# Patient Record
Sex: FEMALE | Race: WHITE | NOT HISPANIC OR LATINO
[De-identification: names, ages, dates, MRNs, and addresses within clinical notes are randomized per-mention and may not be internally consistent; named-entity substitution may affect disease eponyms.]

---

## 2019-02-13 PROBLEM — Z00.00 ENCOUNTER FOR PREVENTIVE HEALTH EXAMINATION: Status: ACTIVE | Noted: 2019-02-13

## 2019-03-20 ENCOUNTER — APPOINTMENT (OUTPATIENT)
Dept: OBGYN | Facility: CLINIC | Age: 24
End: 2019-03-20

## 2019-06-05 ENCOUNTER — APPOINTMENT (OUTPATIENT)
Dept: OBGYN | Facility: CLINIC | Age: 24
End: 2019-06-05
Payer: COMMERCIAL

## 2019-06-05 VITALS
SYSTOLIC BLOOD PRESSURE: 116 MMHG | BODY MASS INDEX: 28.04 KG/M2 | WEIGHT: 185 LBS | DIASTOLIC BLOOD PRESSURE: 64 MMHG | HEIGHT: 68 IN

## 2019-06-05 PROCEDURE — 81025 URINE PREGNANCY TEST: CPT | Mod: NC

## 2019-06-05 PROCEDURE — 81003 URINALYSIS AUTO W/O SCOPE: CPT | Mod: NC,QW

## 2019-06-05 PROCEDURE — 99204 OFFICE O/P NEW MOD 45 MIN: CPT

## 2019-06-06 ENCOUNTER — NON-APPOINTMENT (OUTPATIENT)
Age: 24
End: 2019-06-06

## 2019-06-07 ENCOUNTER — NON-APPOINTMENT (OUTPATIENT)
Age: 24
End: 2019-06-07

## 2019-06-07 LAB
BILIRUB UR QL STRIP: NORMAL
GLUCOSE UR-MCNC: NORMAL
HCG UR QL: 0.2 EU/DL
HCG UR QL: POSITIVE
HGB UR QL STRIP.AUTO: NORMAL
KETONES UR-MCNC: NORMAL
LEUKOCYTE ESTERASE UR QL STRIP: NORMAL
NITRITE UR QL STRIP: NORMAL
PH UR STRIP: 8.5
PROT UR STRIP-MCNC: NORMAL
QUALITY CONTROL: YES
SP GR UR STRIP: 1.01

## 2019-06-10 ENCOUNTER — APPOINTMENT (OUTPATIENT)
Dept: OBGYN | Facility: CLINIC | Age: 24
End: 2019-06-10

## 2019-06-11 ENCOUNTER — APPOINTMENT (OUTPATIENT)
Dept: ANTEPARTUM | Facility: CLINIC | Age: 24
End: 2019-06-11
Payer: COMMERCIAL

## 2019-06-11 ENCOUNTER — ASOB RESULT (OUTPATIENT)
Age: 24
End: 2019-06-11

## 2019-06-11 PROCEDURE — 76805 OB US >/= 14 WKS SNGL FETUS: CPT

## 2019-06-11 PROCEDURE — 76817 TRANSVAGINAL US OBSTETRIC: CPT

## 2019-07-02 ENCOUNTER — APPOINTMENT (OUTPATIENT)
Dept: OBGYN | Facility: CLINIC | Age: 24
End: 2019-07-02

## 2019-07-02 VITALS
DIASTOLIC BLOOD PRESSURE: 78 MMHG | WEIGHT: 194 LBS | BODY MASS INDEX: 29.4 KG/M2 | HEIGHT: 68 IN | SYSTOLIC BLOOD PRESSURE: 120 MMHG

## 2019-07-18 LAB
APPEARANCE: CLEAR
BACTERIA UR CULT: NORMAL
BACTERIA: ABNORMAL
BILIRUBIN URINE: NEGATIVE
BLOOD URINE: NEGATIVE
CANDIDA VAG CYTO: NOT DETECTED
COLOR: YELLOW
G VAGINALIS+PREV SP MTYP VAG QL MICRO: NOT DETECTED
GLUCOSE QUALITATIVE U: NEGATIVE
HYALINE CASTS: 1 /LPF
KETONES URINE: NEGATIVE
LEUKOCYTE ESTERASE URINE: NEGATIVE
MICROSCOPIC-UA: NORMAL
NITRITE URINE: NEGATIVE
PH URINE: 6.5
PROTEIN URINE: NEGATIVE
RED BLOOD CELLS URINE: 2 /HPF
SPECIFIC GRAVITY URINE: 1.01
SQUAMOUS EPITHELIAL CELLS: 2 /HPF
T VAGINALIS VAG QL WET PREP: NOT DETECTED
URINE COMMENTS: NORMAL
UROBILINOGEN URINE: NORMAL
WHITE BLOOD CELLS URINE: 3 /HPF

## 2019-07-31 ENCOUNTER — NON-APPOINTMENT (OUTPATIENT)
Age: 24
End: 2019-07-31

## 2019-07-31 ENCOUNTER — APPOINTMENT (OUTPATIENT)
Dept: OBGYN | Facility: CLINIC | Age: 24
End: 2019-07-31
Payer: COMMERCIAL

## 2019-07-31 VITALS
HEIGHT: 68 IN | SYSTOLIC BLOOD PRESSURE: 122 MMHG | BODY MASS INDEX: 30.31 KG/M2 | WEIGHT: 200 LBS | DIASTOLIC BLOOD PRESSURE: 76 MMHG

## 2019-07-31 LAB
BILIRUB UR QL STRIP: NORMAL
GLUCOSE UR-MCNC: NORMAL
HCG UR QL: 0.2 EU/DL
HGB UR QL STRIP.AUTO: NORMAL
KETONES UR-MCNC: ABNORMAL
LEUKOCYTE ESTERASE UR QL STRIP: NORMAL
NITRITE UR QL STRIP: NORMAL
PH UR STRIP: 6
PROT UR STRIP-MCNC: NORMAL
SP GR UR STRIP: 1.01

## 2019-07-31 PROCEDURE — 36415 COLL VENOUS BLD VENIPUNCTURE: CPT

## 2019-07-31 PROCEDURE — 0502F SUBSEQUENT PRENATAL CARE: CPT

## 2019-08-15 ENCOUNTER — APPOINTMENT (OUTPATIENT)
Dept: OBGYN | Facility: CLINIC | Age: 24
End: 2019-08-15
Payer: COMMERCIAL

## 2019-08-15 ENCOUNTER — APPOINTMENT (OUTPATIENT)
Dept: MATERNAL FETAL MEDICINE | Facility: CLINIC | Age: 24
End: 2019-08-15
Payer: COMMERCIAL

## 2019-08-15 ENCOUNTER — ASOB RESULT (OUTPATIENT)
Age: 24
End: 2019-08-15

## 2019-08-15 ENCOUNTER — APPOINTMENT (OUTPATIENT)
Dept: ANTEPARTUM | Facility: CLINIC | Age: 24
End: 2019-08-15

## 2019-08-15 VITALS — WEIGHT: 200 LBS | SYSTOLIC BLOOD PRESSURE: 120 MMHG | DIASTOLIC BLOOD PRESSURE: 70 MMHG | BODY MASS INDEX: 30.41 KG/M2

## 2019-08-15 PROCEDURE — 76818 FETAL BIOPHYS PROFILE W/NST: CPT

## 2019-08-15 PROCEDURE — 99241 OFFICE CONSULTATION NEW/ESTAB PATIENT 15 MIN: CPT

## 2019-08-15 PROCEDURE — 0502F SUBSEQUENT PRENATAL CARE: CPT

## 2019-08-18 ENCOUNTER — NON-APPOINTMENT (OUTPATIENT)
Age: 24
End: 2019-08-18

## 2019-08-19 LAB
BILIRUB UR QL STRIP: NORMAL
CANDIDA VAG CYTO: NOT DETECTED
G VAGINALIS+PREV SP MTYP VAG QL MICRO: NOT DETECTED
GLUCOSE UR-MCNC: NORMAL
HCG UR QL: 0.2 EU/DL
HGB UR QL STRIP.AUTO: NORMAL
KETONES UR-MCNC: NORMAL
LEUKOCYTE ESTERASE UR QL STRIP: NORMAL
NITRITE UR QL STRIP: NORMAL
PH UR STRIP: 6.5
PROT UR STRIP-MCNC: NORMAL
SP GR UR STRIP: 1.02
T VAGINALIS VAG QL WET PREP: NOT DETECTED

## 2019-08-30 ENCOUNTER — APPOINTMENT (OUTPATIENT)
Dept: OBGYN | Facility: CLINIC | Age: 24
End: 2019-08-30
Payer: COMMERCIAL

## 2019-08-30 ENCOUNTER — NON-APPOINTMENT (OUTPATIENT)
Age: 24
End: 2019-08-30

## 2019-08-30 VITALS
DIASTOLIC BLOOD PRESSURE: 70 MMHG | HEIGHT: 68 IN | WEIGHT: 206 LBS | SYSTOLIC BLOOD PRESSURE: 118 MMHG | BODY MASS INDEX: 31.22 KG/M2

## 2019-08-30 DIAGNOSIS — Z3A.30 30 WEEKS GESTATION OF PREGNANCY: ICD-10-CM

## 2019-08-30 DIAGNOSIS — Z3A.20 20 WEEKS GESTATION OF PREGNANCY: ICD-10-CM

## 2019-08-30 LAB
BILIRUB UR QL STRIP: NORMAL
COLLECTION METHOD: NORMAL
GLUCOSE UR-MCNC: NORMAL
HCG UR QL: 0.2 EU/DL
HGB UR QL STRIP.AUTO: NORMAL
KETONES UR-MCNC: NORMAL
LEUKOCYTE ESTERASE UR QL STRIP: ABNORMAL
NITRITE UR QL STRIP: NORMAL
PH UR STRIP: 7
PROT UR STRIP-MCNC: NORMAL
SP GR UR STRIP: 1.01

## 2019-08-30 PROCEDURE — 90715 TDAP VACCINE 7 YRS/> IM: CPT

## 2019-08-30 PROCEDURE — 0502F SUBSEQUENT PRENATAL CARE: CPT

## 2019-08-30 PROCEDURE — 90471 IMMUNIZATION ADMIN: CPT

## 2019-09-16 ENCOUNTER — APPOINTMENT (OUTPATIENT)
Dept: OBGYN | Facility: CLINIC | Age: 24
End: 2019-09-16
Payer: COMMERCIAL

## 2019-09-16 ENCOUNTER — NON-APPOINTMENT (OUTPATIENT)
Age: 24
End: 2019-09-16

## 2019-09-16 VITALS
SYSTOLIC BLOOD PRESSURE: 110 MMHG | WEIGHT: 207 LBS | DIASTOLIC BLOOD PRESSURE: 70 MMHG | HEIGHT: 68 IN | BODY MASS INDEX: 31.37 KG/M2

## 2019-09-16 LAB
BILIRUB UR QL STRIP: NORMAL
GLUCOSE UR-MCNC: NORMAL
HCG UR QL: 0.2 EU/DL
HGB UR QL STRIP.AUTO: NORMAL
KETONES UR-MCNC: NORMAL
LEUKOCYTE ESTERASE UR QL STRIP: NORMAL
NITRITE UR QL STRIP: NORMAL
PH UR STRIP: 6.5
PROT UR STRIP-MCNC: NORMAL
SP GR UR STRIP: 1.02

## 2019-09-16 PROCEDURE — 0502F SUBSEQUENT PRENATAL CARE: CPT

## 2019-09-23 ENCOUNTER — APPOINTMENT (OUTPATIENT)
Dept: OBGYN | Facility: CLINIC | Age: 24
End: 2019-09-23
Payer: COMMERCIAL

## 2019-09-23 ENCOUNTER — NON-APPOINTMENT (OUTPATIENT)
Age: 24
End: 2019-09-23

## 2019-09-23 VITALS
BODY MASS INDEX: 31.83 KG/M2 | HEIGHT: 68 IN | WEIGHT: 210 LBS | SYSTOLIC BLOOD PRESSURE: 110 MMHG | DIASTOLIC BLOOD PRESSURE: 70 MMHG

## 2019-09-23 LAB
BILIRUB UR QL STRIP: NORMAL
GLUCOSE UR-MCNC: NORMAL
HCG UR QL: 0.2 EU/DL
HGB UR QL STRIP.AUTO: NORMAL
KETONES UR-MCNC: NORMAL
LEUKOCYTE ESTERASE UR QL STRIP: NORMAL
NITRITE UR QL STRIP: NORMAL
PH UR STRIP: 6
PROT UR STRIP-MCNC: NORMAL
SP GR UR STRIP: 1.02

## 2019-09-23 PROCEDURE — 99213 OFFICE O/P EST LOW 20 MIN: CPT | Mod: 25

## 2019-09-23 PROCEDURE — 76817 TRANSVAGINAL US OBSTETRIC: CPT

## 2019-09-30 ENCOUNTER — NON-APPOINTMENT (OUTPATIENT)
Age: 24
End: 2019-09-30

## 2019-09-30 ENCOUNTER — APPOINTMENT (OUTPATIENT)
Dept: OBGYN | Facility: CLINIC | Age: 24
End: 2019-09-30
Payer: COMMERCIAL

## 2019-09-30 VITALS
WEIGHT: 212 LBS | SYSTOLIC BLOOD PRESSURE: 118 MMHG | BODY MASS INDEX: 32.13 KG/M2 | HEIGHT: 68 IN | DIASTOLIC BLOOD PRESSURE: 70 MMHG

## 2019-09-30 DIAGNOSIS — N91.1 SECONDARY AMENORRHEA: ICD-10-CM

## 2019-09-30 DIAGNOSIS — Z3A.32 32 WEEKS GESTATION OF PREGNANCY: ICD-10-CM

## 2019-09-30 PROCEDURE — 36415 COLL VENOUS BLD VENIPUNCTURE: CPT

## 2019-09-30 PROCEDURE — 0502F SUBSEQUENT PRENATAL CARE: CPT

## 2019-10-07 LAB
B-HEM STREP SPEC QL CULT: NORMAL
BILIRUB UR QL STRIP: NORMAL
GLUCOSE UR-MCNC: NORMAL
HCG UR QL: 0.2 EU/DL
HGB UR QL STRIP.AUTO: NORMAL
HIV1+2 AB SPEC QL IA.RAPID: NONREACTIVE
KETONES UR-MCNC: NORMAL
LEUKOCYTE ESTERASE UR QL STRIP: NORMAL
MEV IGG FLD QL IA: >300 AU/ML
MEV IGG+IGM SER-IMP: POSITIVE
NITRITE UR QL STRIP: NORMAL
PH UR STRIP: 7
PROT UR STRIP-MCNC: NORMAL
SP GR UR STRIP: 1.02

## 2019-10-10 ENCOUNTER — APPOINTMENT (OUTPATIENT)
Dept: OBGYN | Facility: CLINIC | Age: 24
End: 2019-10-10
Payer: COMMERCIAL

## 2019-10-10 ENCOUNTER — ASOB RESULT (OUTPATIENT)
Age: 24
End: 2019-10-10

## 2019-10-10 ENCOUNTER — NON-APPOINTMENT (OUTPATIENT)
Age: 24
End: 2019-10-10

## 2019-10-10 VITALS
HEIGHT: 68 IN | DIASTOLIC BLOOD PRESSURE: 80 MMHG | BODY MASS INDEX: 32.13 KG/M2 | WEIGHT: 212 LBS | SYSTOLIC BLOOD PRESSURE: 120 MMHG

## 2019-10-10 PROCEDURE — 0502F SUBSEQUENT PRENATAL CARE: CPT

## 2019-10-10 PROCEDURE — 76816 OB US FOLLOW-UP PER FETUS: CPT

## 2019-10-11 LAB
BILIRUB UR QL STRIP: NORMAL
GLUCOSE UR-MCNC: NORMAL
HCG UR QL: 0.2 EU/DL
HGB UR QL STRIP.AUTO: NORMAL
KETONES UR-MCNC: NORMAL
LEUKOCYTE ESTERASE UR QL STRIP: ABNORMAL
NITRITE UR QL STRIP: NORMAL
PH UR STRIP: 6.5
PROT UR STRIP-MCNC: NORMAL
SP GR UR STRIP: 1.01

## 2019-10-16 ENCOUNTER — NON-APPOINTMENT (OUTPATIENT)
Age: 24
End: 2019-10-16

## 2019-10-16 ENCOUNTER — APPOINTMENT (OUTPATIENT)
Dept: OBGYN | Facility: CLINIC | Age: 24
End: 2019-10-16
Payer: COMMERCIAL

## 2019-10-16 VITALS
HEIGHT: 68 IN | DIASTOLIC BLOOD PRESSURE: 86 MMHG | WEIGHT: 212 LBS | BODY MASS INDEX: 32.13 KG/M2 | SYSTOLIC BLOOD PRESSURE: 118 MMHG

## 2019-10-16 DIAGNOSIS — Z23 ENCOUNTER FOR IMMUNIZATION: ICD-10-CM

## 2019-10-16 DIAGNOSIS — O46.90 ANTEPARTUM HEMORRHAGE, UNSPECIFIED, UNSPECIFIED TRIMESTER: ICD-10-CM

## 2019-10-16 PROCEDURE — 90471 IMMUNIZATION ADMIN: CPT

## 2019-10-16 PROCEDURE — 90686 IIV4 VACC NO PRSV 0.5 ML IM: CPT

## 2019-10-16 PROCEDURE — 0502F SUBSEQUENT PRENATAL CARE: CPT

## 2019-10-23 ENCOUNTER — APPOINTMENT (OUTPATIENT)
Dept: OBGYN | Facility: CLINIC | Age: 24
End: 2019-10-23
Payer: COMMERCIAL

## 2019-10-23 ENCOUNTER — NON-APPOINTMENT (OUTPATIENT)
Age: 24
End: 2019-10-23

## 2019-10-23 VITALS
SYSTOLIC BLOOD PRESSURE: 120 MMHG | BODY MASS INDEX: 32.58 KG/M2 | WEIGHT: 215 LBS | HEIGHT: 68 IN | DIASTOLIC BLOOD PRESSURE: 70 MMHG

## 2019-10-23 LAB
BILIRUB UR QL STRIP: NORMAL
CLARITY UR: CLEAR
COLLECTION METHOD: NORMAL
GLUCOSE UR-MCNC: NORMAL
HCG UR QL: 0.2 EU/DL
HGB UR QL STRIP.AUTO: NORMAL
KETONES UR-MCNC: NORMAL
LEUKOCYTE ESTERASE UR QL STRIP: NORMAL
NITRITE UR QL STRIP: NORMAL
PH UR STRIP: 6.5
PROT UR STRIP-MCNC: NORMAL
SP GR UR STRIP: 1.02

## 2019-10-23 PROCEDURE — 0502F SUBSEQUENT PRENATAL CARE: CPT

## 2019-10-23 PROCEDURE — 76818 FETAL BIOPHYS PROFILE W/NST: CPT

## 2019-10-28 ENCOUNTER — ASOB RESULT (OUTPATIENT)
Age: 24
End: 2019-10-28

## 2019-10-28 ENCOUNTER — APPOINTMENT (OUTPATIENT)
Dept: OBGYN | Facility: CLINIC | Age: 24
End: 2019-10-28
Payer: COMMERCIAL

## 2019-10-28 ENCOUNTER — NON-APPOINTMENT (OUTPATIENT)
Age: 24
End: 2019-10-28

## 2019-10-28 VITALS
BODY MASS INDEX: 33.19 KG/M2 | WEIGHT: 219 LBS | DIASTOLIC BLOOD PRESSURE: 70 MMHG | HEIGHT: 68 IN | SYSTOLIC BLOOD PRESSURE: 118 MMHG

## 2019-10-28 LAB
BILIRUB UR QL STRIP: NORMAL
COLLECTION METHOD: NORMAL
GLUCOSE UR-MCNC: NORMAL
HCG UR QL: 0.2 EU/DL
HGB UR QL STRIP.AUTO: ABNORMAL
KETONES UR-MCNC: NORMAL
LEUKOCYTE ESTERASE UR QL STRIP: NORMAL
NITRITE UR QL STRIP: NORMAL
PH UR STRIP: 7
PROT UR STRIP-MCNC: NORMAL
SP GR UR STRIP: 1.01

## 2019-10-28 PROCEDURE — 76818 FETAL BIOPHYS PROFILE W/NST: CPT

## 2019-10-28 PROCEDURE — 0502F SUBSEQUENT PRENATAL CARE: CPT

## 2019-10-29 ENCOUNTER — INPATIENT (INPATIENT)
Facility: HOSPITAL | Age: 24
LOS: 2 days | Discharge: HOME | End: 2019-11-01
Attending: OBSTETRICS & GYNECOLOGY | Admitting: OBSTETRICS & GYNECOLOGY
Payer: COMMERCIAL

## 2019-10-29 VITALS — RESPIRATION RATE: 16 BRPM | TEMPERATURE: 98 F

## 2019-10-29 DIAGNOSIS — O26.893 OTHER SPECIFIED PREGNANCY RELATED CONDITIONS, THIRD TRIMESTER: ICD-10-CM

## 2019-10-29 LAB
APPEARANCE UR: CLEAR — SIGNIFICANT CHANGE UP
BASOPHILS # BLD AUTO: 0.04 K/UL — SIGNIFICANT CHANGE UP (ref 0–0.2)
BASOPHILS NFR BLD AUTO: 0.4 % — SIGNIFICANT CHANGE UP (ref 0–2)
BILIRUB UR-MCNC: NEGATIVE — SIGNIFICANT CHANGE UP
COLOR SPEC: YELLOW — SIGNIFICANT CHANGE UP
DIFF PNL FLD: ABNORMAL
EOSINOPHIL # BLD AUTO: 0.12 K/UL — SIGNIFICANT CHANGE UP (ref 0–0.5)
EOSINOPHIL NFR BLD AUTO: 1.3 % — SIGNIFICANT CHANGE UP (ref 0–6)
GLUCOSE UR QL: NEGATIVE MG/DL — SIGNIFICANT CHANGE UP
HCT VFR BLD CALC: 37.5 % — SIGNIFICANT CHANGE UP (ref 34.5–45)
HGB BLD-MCNC: 12.7 G/DL — SIGNIFICANT CHANGE UP (ref 11.5–15.5)
IMM GRANULOCYTES NFR BLD AUTO: 0.6 % — SIGNIFICANT CHANGE UP (ref 0–1.5)
KETONES UR-MCNC: NEGATIVE — SIGNIFICANT CHANGE UP
LEUKOCYTE ESTERASE UR-ACNC: NEGATIVE — SIGNIFICANT CHANGE UP
LYMPHOCYTES # BLD AUTO: 1.91 K/UL — SIGNIFICANT CHANGE UP (ref 1–3.3)
LYMPHOCYTES # BLD AUTO: 20.5 % — SIGNIFICANT CHANGE UP (ref 13–44)
MCHC RBC-ENTMCNC: 29.5 PG — SIGNIFICANT CHANGE UP (ref 27–34)
MCHC RBC-ENTMCNC: 33.9 GM/DL — SIGNIFICANT CHANGE UP (ref 32–36)
MCV RBC AUTO: 87 FL — SIGNIFICANT CHANGE UP (ref 80–100)
MONOCYTES # BLD AUTO: 1.06 K/UL — HIGH (ref 0–0.9)
MONOCYTES NFR BLD AUTO: 11.4 % — SIGNIFICANT CHANGE UP (ref 2–14)
NEUTROPHILS # BLD AUTO: 6.12 K/UL — SIGNIFICANT CHANGE UP (ref 1.8–7.4)
NEUTROPHILS NFR BLD AUTO: 65.8 % — SIGNIFICANT CHANGE UP (ref 43–77)
NITRITE UR-MCNC: NEGATIVE — SIGNIFICANT CHANGE UP
PH UR: 8 — SIGNIFICANT CHANGE UP (ref 5–8)
PLATELET # BLD AUTO: 246 K/UL — SIGNIFICANT CHANGE UP (ref 150–400)
PROT UR-MCNC: 15 MG/DL
RBC # BLD: 4.31 M/UL — SIGNIFICANT CHANGE UP (ref 3.8–5.2)
RBC # FLD: 12.5 % — SIGNIFICANT CHANGE UP (ref 10.3–14.5)
SP GR SPEC: 1.02 — SIGNIFICANT CHANGE UP (ref 1.01–1.02)
UROBILINOGEN FLD QL: 1 MG/DL
WBC # BLD: 9.31 K/UL — SIGNIFICANT CHANGE UP (ref 3.8–10.5)
WBC # FLD AUTO: 9.31 K/UL — SIGNIFICANT CHANGE UP (ref 3.8–10.5)

## 2019-10-29 RX ORDER — SODIUM CHLORIDE 9 MG/ML
1000 INJECTION, SOLUTION INTRAVENOUS
Refills: 0 | Status: DISCONTINUED | OUTPATIENT
Start: 2019-10-29 | End: 2019-10-30

## 2019-10-29 RX ORDER — CITRIC ACID/SODIUM CITRATE 300-500 MG
30 SOLUTION, ORAL ORAL ONCE
Refills: 0 | Status: COMPLETED | OUTPATIENT
Start: 2019-10-29 | End: 2019-10-30

## 2019-10-29 RX ORDER — OXYTOCIN 10 UNIT/ML
333.33 VIAL (ML) INJECTION
Qty: 20 | Refills: 0 | Status: COMPLETED | OUTPATIENT
Start: 2019-10-29 | End: 2019-10-30

## 2019-10-29 RX ADMIN — SODIUM CHLORIDE 125 MILLILITER(S): 9 INJECTION, SOLUTION INTRAVENOUS at 23:10

## 2019-10-29 NOTE — OB PROVIDER H&P - ATTENDING COMMENTS
Agree with above. Patient admitted for induction of labor due to late term at 41 weeks. SVE: 1/long/-3. Will start Cytotec induction. Consent obtained. All of her questions and concerns were discussed.

## 2019-10-29 NOTE — OB PROVIDER H&P - ASSESSMENT
Patient is a 23yo  at 39w6d here for IOL for postdates  - Consent  - Admission labs  - GBS neg  - Will admit and induce with cytotec    D/w Dr. Salomon Patient is a 25yo  at 41w here for IOL for postdates  - Consent  - Admission labs  - Unlikely ruptured given  - GBS neg  - Will admit and induce with cytotec    D/w Dr. Salomon Patient is a 25 y/o  at 41 wks GA, admitted for induction of labor for late term.    - Admit to L&D.  - Induction with Cytotec.  - Consent obtained.  - Admission labs.  - ROM ruled out.  - GBS neg.  - CEFM + [place].  - Anticipate .    D/w Dr. Salomon

## 2019-10-29 NOTE — OB RN DELIVERY SUMMARY - NS_SEPSISRSKCALC_OBGYN_ALL_OB_FT
EOS calculated successfully. EOS Risk Factor: 0.5/1000 live births ([facility] [place] national incidence); GA=41w;Temp=98.6; ROM=4.133; GBS='Negative'; Antibiotics='No antibiotics or any antibiotics < 2 hrs prior to birth'

## 2019-10-29 NOTE — OB PROVIDER H&P - HISTORY OF PRESENT ILLNESS
Patient is a 25 yo  at 40wk6d weeks gestation c/w LMP who presents to L&D for IOL for postdates    LEXY: 10/23/2019  LMP: 2019    Prenatal course uncomplicated    OBHx: Denies  PMH: Denies  PSH: Harrisonies  Meds: PNV  ALL: NKDA Patient is a 23 yo  at 41weeks gestation c/w LMP who presents to L&D for IOL for postdates.  neg LOF but brown discharge since yesterday.    LEXY: 10/23/2019  LMP: 2019    Prenatal course uncomplicated    OBHx: Denies  PMH: Denies  PSH: Denies  Meds: PNV  ALL: NKDA Patient is a 23 y/o  at 41weeks gestation c/w LMP who presents to L&D for IOL for postdates.    neg LOF but brown discharge since yesterday.    LEXY: 10/23/2019  LMP: 2019    Prenatal course uncomplicated    OBHx: Denies  PMH: Denies  PSH: Denies  Meds: PNV  ALL: NKDA Patient is a 25 y/o  at 41 weeks gestation c/w LMP, who presents to L&D for IOL for postdates. She is having irregular contractions. She reports having brown spotting since her vaginal exam yesterday. She is also having watery vaginal discharge. +Decreased fetal movement x 1 day.    LEXY: 10/23/2019  LMP: 2019    Prenatal course uncomplicated,    PMH: CF carrier ( was negative)  OBHx: Denies  Gyn Hx: Denies any h/o STIs.  PSH: Denies  Meds: PNV  ALL: NKDA

## 2019-10-29 NOTE — OB RN DELIVERY SUMMARY - NS_LABORCHARACTER_OBGYN_ALL_OB
External electronic FM External electronic FM/Induction of labor-AROM/Internal electronic FM/Meconium staining/Fetal intolerance/Induction of labor-Medicinal

## 2019-10-29 NOTE — OB PROVIDER H&P - NSHPPHYSICALEXAM_GEN_ALL_CORE
General: Alert and oriented x3, NAD  Abd: soft, nontender, gravid  SVE:    Vital Signs Last 24 Hrs  T(C): 36.7 (29 Oct 2019 23:05), Max: 36.7 (29 Oct 2019 23:01)  T(F): 98.1 (29 Oct 2019 23:05), Max: 98.1 (29 Oct 2019 23:05)  HR: 86 (29 Oct 2019 23:05) (86 - 86)  BP: 137/79 (29 Oct 2019 23:05) (137/79 - 137/79)  RR: 16 (29 Oct 2019 23:05) (16 - 16)    Sono: Vertex, fundal placenta General: Alert and oriented x3, NAD  Abd: soft, nontender, gravid  SVE: 1/long/-3  SSE: -pooling, -nitrazine, -amnisure    Vital Signs Last 24 Hrs  T(C): 36.7 (29 Oct 2019 23:05), Max: 36.7 (29 Oct 2019 23:01)  T(F): 98.1 (29 Oct 2019 23:05), Max: 98.1 (29 Oct 2019 23:05)  HR: 86 (29 Oct 2019 23:05) (86 - 86)  BP: 137/79 (29 Oct 2019 23:05) (137/79 - 137/79)  RR: 16 (29 Oct 2019 23:05) (16 - 16)    Sono: Vertex, fundal placenta General: Alert and oriented x3, NAD  Abd: soft, nontender, gravid  SSE: Brown vaginal discharge. No red vaginal blood. -pooling, -nitrazine, -amnisure  SVE: 1/long/-3    Vital Signs Last 24 Hrs  T(C): 36.7 (29 Oct 2019 23:05), Max: 36.7 (29 Oct 2019 23:01)  T(F): 98.1 (29 Oct 2019 23:05), Max: 98.1 (29 Oct 2019 23:05)  HR: 86 (29 Oct 2019 23:05) (86 - 86)  BP: 137/79 (29 Oct 2019 23:05) (137/79 - 137/79)  RR: 16 (29 Oct 2019 23:05) (16 - 16)    Sono: Vertex, fundal placenta

## 2019-10-29 NOTE — OB PROVIDER H&P - ALERT: PERTINENT HISTORY
1st Trimester Sonogram/20 Week Level II Sonogram 1st Trimester Sonogram/20 Week Level II Sonogram/Ultra Screen at 12 Weeks

## 2019-10-29 NOTE — OB PROVIDER H&P - BIRTH SEX
Peripheral Block Start time: 1/15/2019 7:04 AM 
End time: 1/15/2019 7:12 AM 
Performed by: Isak Ramires MD 
Authorized by: Isak Ramires MD  
 
 
Pre-procedure: Indications: at surgeon's request, post-op pain management and procedure for pain Preanesthetic Checklist: patient identified, risks and benefits discussed, site marked, timeout performed, anesthesia consent given and patient being monitored Timeout Time: 07:04 Block Type:  
Block Type:  Lumbar plexus Laterality:  Left Monitoring:  Standard ASA monitoring, heart rate, continuous pulse ox, frequent vital sign checks, oxygen and responsive to questions Injection Technique:  Single shot Procedures: nerve stimulator Patient Position: right lateral decubitus Prep: alcohol and chlorhexidine Location:  Sacral area Needle Type:  Stimuplex Needle Gauge:  21 G Needle Localization:  Anatomical landmarks and nerve stimulator Assessment: 
Number of attempts:  1 Injection Assessment:  Incremental injection every 5 mL, negative aspiration for blood, no intravascular symptoms, no paresthesia and negative aspiration for CSF Patient tolerance:  Patient tolerated the procedure well with no immediate complications Location:  PREOP HOLDING Patient given 2 mg IV Versed and 100 mcg IV Fentanyl for sedation.  
 
1/15/2019     7:16 AM     Andrey Wolfe MD 
 
 
 
 Female

## 2019-10-30 ENCOUNTER — TRANSCRIPTION ENCOUNTER (OUTPATIENT)
Age: 24
End: 2019-10-30

## 2019-10-30 ENCOUNTER — APPOINTMENT (OUTPATIENT)
Dept: OBGYN | Facility: HOSPITAL | Age: 24
End: 2019-10-30

## 2019-10-30 ENCOUNTER — RESULT REVIEW (OUTPATIENT)
Age: 24
End: 2019-10-30

## 2019-10-30 LAB
ABO RH CONFIRMATION: SIGNIFICANT CHANGE UP
BLD GP AB SCN SERPL QL: SIGNIFICANT CHANGE UP
EPI CELLS # UR: SIGNIFICANT CHANGE UP
MEV IGG SER-ACNC: >300 AU/ML — SIGNIFICANT CHANGE UP
MEV IGG+IGM SER-IMP: POSITIVE — SIGNIFICANT CHANGE UP
RBC CASTS # UR COMP ASSIST: SIGNIFICANT CHANGE UP /HPF (ref 0–4)
T PALLIDUM AB TITR SER: NEGATIVE — SIGNIFICANT CHANGE UP
WBC UR QL: NEGATIVE — SIGNIFICANT CHANGE UP

## 2019-10-30 PROCEDURE — 59400 OBSTETRICAL CARE: CPT

## 2019-10-30 PROCEDURE — 88307 TISSUE EXAM BY PATHOLOGIST: CPT | Mod: 26

## 2019-10-30 RX ORDER — SODIUM CHLORIDE 9 MG/ML
3 INJECTION INTRAMUSCULAR; INTRAVENOUS; SUBCUTANEOUS EVERY 8 HOURS
Refills: 0 | Status: DISCONTINUED | OUTPATIENT
Start: 2019-10-30 | End: 2019-11-01

## 2019-10-30 RX ORDER — AER TRAVELER 0.5 G/1
1 SOLUTION RECTAL; TOPICAL EVERY 4 HOURS
Refills: 0 | Status: DISCONTINUED | OUTPATIENT
Start: 2019-10-30 | End: 2019-11-01

## 2019-10-30 RX ORDER — ACETAMINOPHEN 500 MG
1000 TABLET ORAL ONCE
Refills: 0 | Status: COMPLETED | OUTPATIENT
Start: 2019-10-30 | End: 2019-10-30

## 2019-10-30 RX ORDER — MAGNESIUM HYDROXIDE 400 MG/1
30 TABLET, CHEWABLE ORAL
Refills: 0 | Status: DISCONTINUED | OUTPATIENT
Start: 2019-10-30 | End: 2019-11-01

## 2019-10-30 RX ORDER — LANOLIN
1 OINTMENT (GRAM) TOPICAL EVERY 6 HOURS
Refills: 0 | Status: DISCONTINUED | OUTPATIENT
Start: 2019-10-30 | End: 2019-11-01

## 2019-10-30 RX ORDER — OXYTOCIN 10 UNIT/ML
333.33 VIAL (ML) INJECTION
Qty: 20 | Refills: 0 | Status: DISCONTINUED | OUTPATIENT
Start: 2019-10-30 | End: 2019-11-01

## 2019-10-30 RX ORDER — OXYTOCIN 10 UNIT/ML
333.33 VIAL (ML) INJECTION
Qty: 20 | Refills: 0 | Status: DISCONTINUED | OUTPATIENT
Start: 2019-10-30 | End: 2019-10-30

## 2019-10-30 RX ORDER — SODIUM CHLORIDE 9 MG/ML
1000 INJECTION, SOLUTION INTRAVENOUS
Refills: 0 | Status: DISCONTINUED | OUTPATIENT
Start: 2019-10-30 | End: 2019-10-30

## 2019-10-30 RX ORDER — DIPHENHYDRAMINE HCL 50 MG
25 CAPSULE ORAL EVERY 6 HOURS
Refills: 0 | Status: DISCONTINUED | OUTPATIENT
Start: 2019-10-30 | End: 2019-11-01

## 2019-10-30 RX ORDER — OXYCODONE HYDROCHLORIDE 5 MG/1
5 TABLET ORAL
Refills: 0 | Status: DISCONTINUED | OUTPATIENT
Start: 2019-10-30 | End: 2019-11-01

## 2019-10-30 RX ORDER — ACETAMINOPHEN 500 MG
975 TABLET ORAL
Refills: 0 | Status: DISCONTINUED | OUTPATIENT
Start: 2019-10-30 | End: 2019-11-01

## 2019-10-30 RX ORDER — CEFAZOLIN SODIUM 1 G
2000 VIAL (EA) INJECTION ONCE
Refills: 0 | Status: DISCONTINUED | OUTPATIENT
Start: 2019-10-30 | End: 2019-10-30

## 2019-10-30 RX ORDER — TETANUS TOXOID, REDUCED DIPHTHERIA TOXOID AND ACELLULAR PERTUSSIS VACCINE, ADSORBED 5; 2.5; 8; 8; 2.5 [IU]/.5ML; [IU]/.5ML; UG/.5ML; UG/.5ML; UG/.5ML
0.5 SUSPENSION INTRAMUSCULAR ONCE
Refills: 0 | Status: DISCONTINUED | OUTPATIENT
Start: 2019-10-30 | End: 2019-11-01

## 2019-10-30 RX ORDER — IBUPROFEN 200 MG
600 TABLET ORAL EVERY 6 HOURS
Refills: 0 | Status: DISCONTINUED | OUTPATIENT
Start: 2019-10-30 | End: 2019-11-01

## 2019-10-30 RX ORDER — HYDROCORTISONE 1 %
1 OINTMENT (GRAM) TOPICAL EVERY 6 HOURS
Refills: 0 | Status: DISCONTINUED | OUTPATIENT
Start: 2019-10-30 | End: 2019-11-01

## 2019-10-30 RX ORDER — FAMOTIDINE 10 MG/ML
20 INJECTION INTRAVENOUS ONCE
Refills: 0 | Status: DISCONTINUED | OUTPATIENT
Start: 2019-10-30 | End: 2019-10-30

## 2019-10-30 RX ORDER — CITRIC ACID/SODIUM CITRATE 300-500 MG
30 SOLUTION, ORAL ORAL ONCE
Refills: 0 | Status: DISCONTINUED | OUTPATIENT
Start: 2019-10-30 | End: 2019-10-30

## 2019-10-30 RX ORDER — IBUPROFEN 200 MG
600 TABLET ORAL EVERY 6 HOURS
Refills: 0 | Status: COMPLETED | OUTPATIENT
Start: 2019-10-30 | End: 2020-09-27

## 2019-10-30 RX ORDER — GLYCERIN ADULT
1 SUPPOSITORY, RECTAL RECTAL AT BEDTIME
Refills: 0 | Status: DISCONTINUED | OUTPATIENT
Start: 2019-10-30 | End: 2019-11-01

## 2019-10-30 RX ORDER — PRAMOXINE HYDROCHLORIDE 150 MG/15G
1 AEROSOL, FOAM RECTAL EVERY 4 HOURS
Refills: 0 | Status: DISCONTINUED | OUTPATIENT
Start: 2019-10-30 | End: 2019-11-01

## 2019-10-30 RX ORDER — SODIUM CHLORIDE 9 MG/ML
1000 INJECTION, SOLUTION INTRAVENOUS
Refills: 0 | Status: DISCONTINUED | OUTPATIENT
Start: 2019-10-30 | End: 2019-11-01

## 2019-10-30 RX ORDER — SODIUM CHLORIDE 9 MG/ML
1000 INJECTION, SOLUTION INTRAVENOUS ONCE
Refills: 0 | Status: DISCONTINUED | OUTPATIENT
Start: 2019-10-30 | End: 2019-10-30

## 2019-10-30 RX ORDER — METOCLOPRAMIDE HCL 10 MG
10 TABLET ORAL ONCE
Refills: 0 | Status: DISCONTINUED | OUTPATIENT
Start: 2019-10-30 | End: 2019-10-30

## 2019-10-30 RX ORDER — SIMETHICONE 80 MG/1
80 TABLET, CHEWABLE ORAL EVERY 4 HOURS
Refills: 0 | Status: DISCONTINUED | OUTPATIENT
Start: 2019-10-30 | End: 2019-11-01

## 2019-10-30 RX ORDER — OXYTOCIN 10 UNIT/ML
2 VIAL (ML) INJECTION
Qty: 30 | Refills: 0 | Status: DISCONTINUED | OUTPATIENT
Start: 2019-10-30 | End: 2019-11-01

## 2019-10-30 RX ORDER — BENZOCAINE 10 %
1 GEL (GRAM) MUCOUS MEMBRANE EVERY 6 HOURS
Refills: 0 | Status: DISCONTINUED | OUTPATIENT
Start: 2019-10-30 | End: 2019-11-01

## 2019-10-30 RX ORDER — DIBUCAINE 1 %
1 OINTMENT (GRAM) RECTAL EVERY 6 HOURS
Refills: 0 | Status: DISCONTINUED | OUTPATIENT
Start: 2019-10-30 | End: 2019-11-01

## 2019-10-30 RX ORDER — KETOROLAC TROMETHAMINE 30 MG/ML
30 SYRINGE (ML) INJECTION ONCE
Refills: 0 | Status: DISCONTINUED | OUTPATIENT
Start: 2019-10-30 | End: 2019-10-30

## 2019-10-30 RX ORDER — OXYCODONE HYDROCHLORIDE 5 MG/1
5 TABLET ORAL ONCE
Refills: 0 | Status: DISCONTINUED | OUTPATIENT
Start: 2019-10-30 | End: 2019-11-01

## 2019-10-30 RX ADMIN — SODIUM CHLORIDE 3 MILLILITER(S): 9 INJECTION INTRAMUSCULAR; INTRAVENOUS; SUBCUTANEOUS at 22:56

## 2019-10-30 RX ADMIN — SODIUM CHLORIDE 125 MILLILITER(S): 9 INJECTION, SOLUTION INTRAVENOUS at 04:45

## 2019-10-30 RX ADMIN — Medication 400 MILLIGRAM(S): at 03:18

## 2019-10-30 RX ADMIN — Medication 30 MILLIGRAM(S): at 16:35

## 2019-10-30 RX ADMIN — Medication 30 MILLILITER(S): at 09:31

## 2019-10-30 RX ADMIN — Medication 975 MILLIGRAM(S): at 20:59

## 2019-10-30 RX ADMIN — Medication 975 MILLIGRAM(S): at 20:29

## 2019-10-30 RX ADMIN — Medication 1000 MILLIUNIT(S)/MIN: at 15:22

## 2019-10-30 RX ADMIN — Medication 30 MILLIGRAM(S): at 16:50

## 2019-10-30 RX ADMIN — Medication 1000 MILLIGRAM(S): at 03:33

## 2019-10-30 NOTE — DISCHARGE NOTE OB - CARE PLAN
Principal Discharge DX:	 (normal spontaneous vaginal delivery)  Goal:	Rapid recovery  Assessment and plan of treatment:	Please call your provider to schedule postpartum visit in 6 weeks. Take medications as directed, regular diet, activity as tolerated. Exclusive breast feeding for the first 6 months is recommended. Nothing per vagina for 6 weeks (incl. sex, douching, etc). If you have additional concerns, please inform your provider.

## 2019-10-30 NOTE — OB PROVIDER DELIVERY SUMMARY - NSPROVIDERDELIVERYNOTE_OBGYN_ALL_OB_FT
Well controlled vaginal delivery over an intact perineum, a vigorous female baby delivered, DOA presentation, apgars 9/9, delayed cord clamping 30 seconds, cord pH sent, placenta to pathology, weight deferred to skin to skin.   right vaginal sidewall laceration repaired in layers with 2-0, 3-0 Vicryl, median laceration, second degree repaired in layers with 2-0/3-0 Vicryl. Rectum, sphincter, cervix and upper vagina intact.    cc.

## 2019-10-30 NOTE — CHART NOTE - NSCHARTNOTEFT_GEN_A_CORE
Patient is resting comfortably in bed with no complaints. She recently received an epidural for pain management.    FHT: Category 1 prior to AROM -> 2 prolonged decelerations after AROM (3 minutes and 6 minute prolonged deceleration).  [place]: Ctx q 2-4 minutes    SVE: 5/90/-1, AROM- meconium stained.    Resuscitative measures were initiated- oxygen mask, left lateral positioning, IVF hydration, and fetal scalp stimulation. FSE and IUPC were placed.    After she initially appeared to be unresponsive to resuscitative measures for the prolonged decelerations, a stat C/S was called. FSE and IUPC were removed. Anesthesia was notified and she received an epidural bolus for the anticipated C/S. However, fetal heart tracing recovered with baseline of 130 bpms with moderate variability, thus the stat C/S was cancelled.    Expectant management for now. Will continue to monitor.

## 2019-10-30 NOTE — CHART NOTE - NSCHARTNOTEFT_GEN_A_CORE
Patient reports having moderate pain with her contractions and is requesting an epidural.    FHT: Category 1  [place]: Ctx q 2-4 minutes    SVE: /-2, Intracervical almeida out    A/P: 23 y/o  at 41 0/7 wks GA, admitted for induction due to late term.    -Patient desires an epidural for pain management. Anesthesia was notified.  -She will be moved from Labor Room C to Room 1 soon.  -Discontinue Cytotec regimen. s/p Cytotec 20 mcg x 3 and Cytotec 40 mcg x 1.  -Will start Pitocin after epidural placement.  -CEFM + [place].  -IVF hydration.  -Anticipate .    Plan of care was discussed with the patient and her partner.

## 2019-10-30 NOTE — DISCHARGE NOTE OB - PLAN OF CARE
Rapid recovery Please call your provider to schedule postpartum visit in 6 weeks. Take medications as directed, regular diet, activity as tolerated. Exclusive breast feeding for the first 6 months is recommended. Nothing per vagina for 6 weeks (incl. sex, douching, etc). If you have additional concerns, please inform your provider.

## 2019-10-30 NOTE — CHART NOTE - NSCHARTNOTEFT_GEN_A_CORE
Miracle had a spontaneous FHR deceleration after AROM by  Dr. Salomon. She was resuscitated with repositioning, increased IVF, increased FIO2 and scalp stimulation. She recovered nicely and remained normotensive. Anesthesia initially dosed the epidural anticipating a possible operative delivery. She was 5-6cm/90%/-1 station and there was clear AF noted. The FSE was replaced and the tracing returned to baseline with mild intermittent variable decels. after a period of observation she began to have rectal pressure and she has some early and variable decels with quick recovery to baseline. She was re-examined and was noted to be fully dilated at +1 station. She has a significant motor block from the top off of the epidural and she will start pushing. We discussed the issues and expectations. All questions were answered and support was given.

## 2019-10-30 NOTE — DISCHARGE NOTE OB - PATIENT PORTAL LINK FT
You can access the FollowMyHealth Patient Portal offered by [facility] by registering at the following website: [email or web address]. By joining [employer]’s FollowMyHealth portal, you will also be able to view your health information using other applications (apps) compatible with our system.

## 2019-10-30 NOTE — DISCHARGE NOTE OB - CARE PROVIDER_API CALL
Vivien Helms)  Obstetrics and Gynecology  [facility] [place] [address]  Phone: [phone]  Fax: [phone]  Follow Up Time:

## 2019-10-30 NOTE — CHART NOTE - NSCHARTNOTEFT_GEN_A_CORE
Miracle is doing well with irregular contractions and she is progressing on the Cytotec protocol. VE= FT/30%/-2, intact membranes and she has a stable category 1 FHR tracing. We discussed the option of placing a transcervical balloon catheter and I discussed the risks and benefits and details of the insertion. She agrees with the plan and we will set up for the insertion. All questions were answered and support was given.

## 2019-10-30 NOTE — OB PROVIDER IHI INDUCTION/AUGMENTATION NOTE - NS_CHECKALL_OBGYN_ALL_OB
Contractions pattern was reviewed/FHR was reviewed/H&P was completed/Order was written H&P was completed/FHR was reviewed/Order was written/Contractions pattern was reviewed/Induction / Augmentation was discussed

## 2019-10-30 NOTE — CHART NOTE - NSCHARTNOTEFT_GEN_A_CORE
After a thorough discussion of the procedural details and risks and benefits, I inserted the Cook double balloon transcervically under aseptic conditions. I inflated the internal balloon to 40 cc with saline and retracted back and filled the vaginal balloon with 40 cc bautista saline. This was well tolerated. She will get OOB on the birthing ball and she will request and epidural when she is very uncomfortable. All questions were answered and support was given. She was oriented to the expectation of progress in an induction.

## 2019-10-31 LAB
HCT VFR BLD CALC: 32.2 % — LOW (ref 34.5–45)
HGB BLD-MCNC: 10.6 G/DL — LOW (ref 11.5–15.5)

## 2019-10-31 RX ORDER — IBUPROFEN 200 MG
1 TABLET ORAL
Qty: 20 | Refills: 0
Start: 2019-10-31 | End: 2019-11-04

## 2019-10-31 RX ADMIN — SODIUM CHLORIDE 3 MILLILITER(S): 9 INJECTION INTRAMUSCULAR; INTRAVENOUS; SUBCUTANEOUS at 05:40

## 2019-10-31 RX ADMIN — Medication 600 MILLIGRAM(S): at 12:30

## 2019-10-31 RX ADMIN — Medication 975 MILLIGRAM(S): at 22:15

## 2019-10-31 RX ADMIN — Medication 975 MILLIGRAM(S): at 09:52

## 2019-10-31 RX ADMIN — Medication 600 MILLIGRAM(S): at 11:52

## 2019-10-31 RX ADMIN — Medication 600 MILLIGRAM(S): at 05:42

## 2019-10-31 RX ADMIN — Medication 975 MILLIGRAM(S): at 21:40

## 2019-10-31 RX ADMIN — Medication 975 MILLIGRAM(S): at 17:00

## 2019-10-31 RX ADMIN — Medication 975 MILLIGRAM(S): at 10:30

## 2019-10-31 RX ADMIN — Medication 600 MILLIGRAM(S): at 01:21

## 2019-10-31 RX ADMIN — Medication 600 MILLIGRAM(S): at 00:51

## 2019-10-31 RX ADMIN — Medication 975 MILLIGRAM(S): at 16:06

## 2019-10-31 RX ADMIN — Medication 600 MILLIGRAM(S): at 06:12

## 2019-10-31 RX ADMIN — Medication 975 MILLIGRAM(S): at 04:15

## 2019-10-31 RX ADMIN — Medication 1 TABLET(S): at 17:00

## 2019-10-31 RX ADMIN — Medication 975 MILLIGRAM(S): at 04:45

## 2019-10-31 RX ADMIN — Medication 600 MILLIGRAM(S): at 17:07

## 2019-10-31 NOTE — PROGRESS NOTE ADULT - SUBJECTIVE AND OBJECTIVE BOX
MADELIN KHALIL is a 23yo  now PPD#1 s/p spontaneous vaginal delivery at 41 weeks gestation    S:    The patient has no complaints.  Pain controlled with current medications.   She is ambulating without difficulty and tolerating PO   + flatus/-BM/+ voiding     O:    T(C): 36.8 (1019 @ 22:42), Max: 37.6 (10-30-19 @ 16:08)  HR: 92 (10-30-19 @ 22:42) (70 - 123)  BP: 109/69 (10-30-19 @ 22:42) (109/69 - 149/105)  RR: 18 (10-30-19 @ 22:42) (14 - 18)  SpO2: 96% (10-30-19 @ 22:42) (96% - 100%)    Gen: NAD, AOx3  CV: RRR  Pulm: CTAB  Breast: nontender, non-engorged   Abdomen:  soft, non-tender, non-distended, +bowel sounds.  Uterus:  Fundus firm below umbilicus  VE:  +lochia  Ext:  Non-tender.                          12.7   9.31  )-----------( 246      ( 29 Oct 2019 23:37 )             37.5

## 2019-10-31 NOTE — PROGRESS NOTE ADULT - ASSESSMENT
A/P:  Patient is a 23yo  now PPD#1 s/p spontaneous vaginal delivery at 41 weeks gestation  -Stable  -Voiding, tolerating PO, bowel function nml   -Advance care as tolerated   -Continue routine postpartum care and education.  -Healthy male infant, desires/declines circumcision. A/P:  Patient is a 23yo  now PPD#1 s/p spontaneous vaginal delivery at 41 weeks gestation  -Stable  -Voiding, tolerating PO, bowel function nml   -Advance care as tolerated   -Continue routine postpartum care and education.  -Healthy female infant

## 2019-11-01 VITALS
TEMPERATURE: 98 F | DIASTOLIC BLOOD PRESSURE: 84 MMHG | SYSTOLIC BLOOD PRESSURE: 130 MMHG | HEART RATE: 99 BPM | RESPIRATION RATE: 20 BRPM

## 2019-11-01 PROCEDURE — 86900 BLOOD TYPING SEROLOGIC ABO: CPT

## 2019-11-01 PROCEDURE — 88307 TISSUE EXAM BY PATHOLOGIST: CPT

## 2019-11-01 PROCEDURE — 85014 HEMATOCRIT: CPT

## 2019-11-01 PROCEDURE — 59025 FETAL NON-STRESS TEST: CPT

## 2019-11-01 PROCEDURE — 86780 TREPONEMA PALLIDUM: CPT

## 2019-11-01 PROCEDURE — 81001 URINALYSIS AUTO W/SCOPE: CPT

## 2019-11-01 PROCEDURE — 85018 HEMOGLOBIN: CPT

## 2019-11-01 PROCEDURE — 36415 COLL VENOUS BLD VENIPUNCTURE: CPT

## 2019-11-01 PROCEDURE — 86901 BLOOD TYPING SEROLOGIC RH(D): CPT

## 2019-11-01 PROCEDURE — 86850 RBC ANTIBODY SCREEN: CPT

## 2019-11-01 PROCEDURE — 59050 FETAL MONITOR W/REPORT: CPT

## 2019-11-01 PROCEDURE — G0463: CPT

## 2019-11-01 PROCEDURE — 85027 COMPLETE CBC AUTOMATED: CPT

## 2019-11-01 PROCEDURE — 86765 RUBEOLA ANTIBODY: CPT

## 2019-11-01 RX ADMIN — Medication 975 MILLIGRAM(S): at 08:52

## 2019-11-01 RX ADMIN — Medication 600 MILLIGRAM(S): at 02:00

## 2019-11-01 RX ADMIN — Medication 975 MILLIGRAM(S): at 09:52

## 2019-11-01 RX ADMIN — Medication 600 MILLIGRAM(S): at 13:12

## 2019-11-01 RX ADMIN — Medication 600 MILLIGRAM(S): at 01:16

## 2019-11-01 RX ADMIN — Medication 600 MILLIGRAM(S): at 12:12

## 2019-11-01 RX ADMIN — Medication 1 TABLET(S): at 12:12

## 2019-11-01 NOTE — PROGRESS NOTE ADULT - SUBJECTIVE AND OBJECTIVE BOX
Postpartum day #2 status post vaginal delivery.  A POSITIVE, Rubella Immune      S:  The patient has no complaints.      O:      Vitals  T(C): 36.8 (11-01-19 @ 08:09), Max: 36.8 (11-01-19 @ 08:09)  HR: 99 (11-01-19 @ 08:09) (99 - 99)  BP: 130/84 (11-01-19 @ 08:09) (130/84 - 130/84)  RR: 20 (11-01-19 @ 08:09) (20 - 20)      Physical Exam  Abdomen:  soft, non-tender, non-distended.  Uterus:  Fundus firm below umbilicus.  Vaginal exam:  Lochia.  Extremities:  Non-tender      A/P:  PPD#2  -Stable.  Continue routine postpartum care and education.  -Discharge home with instructions. Postpartum day #2 status post vaginal delivery.  A POSITIVE, Rubella Immune      S:  The patient has no complaints.      O:      Vitals  T(C): 36.8 (11-01-19 @ 08:09), Max: 36.8 (11-01-19 @ 08:09)  HR: 99 (11-01-19 @ 08:09) (99 - 99)  BP: 130/84 (11-01-19 @ 08:09) (130/84 - 130/84)  RR: 20 (11-01-19 @ 08:09) (20 - 20)      Labs  Complete Blood Count + Automated Diff (10.29.19 @ 23:37)    WBC Count: 9.31 K/uL    Hemoglobin: 12.7 g/dL    Hematocrit: 37.5 %    Platelet Count - Automated: 246 K/uL      Physical Exam  Abdomen:  soft, non-tender, non-distended.  Uterus:  Fundus firm below umbilicus.  Vaginal exam:  Lochia.  Extremities:  Non-tender      A/P:  PPD#2  -Stable.  Continue routine postpartum care and education.  -Discharge home with instructions.

## 2019-11-01 NOTE — PROGRESS NOTE ADULT - SUBJECTIVE AND OBJECTIVE BOX
MRN: [id number]  Date Admitted: 10-29-19  Location: [address] ([facility] 2EST)  Attending: Carol Salomon      Post Partum Progress Note: Vaginal delivery     MADELIN KHALIL is a 23yo  now PPD#2 s/p spontaneous vaginal delivery at 41 weeks gestation      SUBJECTIVE:  Patient was seen and examined at bedside.   No acute events overnight per nursing.   Reports feeling well this AM. Patient is bonding with infant.   Pain is well controlled with PRN pain medication.   Tolerating a regular diet. Denies nausea/vomiting. +Flatus, No BM.   Voiding spontaneously. Reporting moderate discomfort with urination; attributed to the perineal tear.  Ambulating without assistance.   She is breastfeeding and the baby is latching on.  Denies fevers, chills, SOB, CP, HA, vision changes or calf pain.      OBJECTIVE:  Physical exam:  General: AOx3, NAD.  Heart: RRR. S1S2.  Lungs: CTABL. Good airflow bilaterally.   Abdomen: +BS, Soft, appropriately tender, nondistended, no guarding or rebound tenderness, firm uterine fundus at umbilicus  Ext: No DVT signs, warm extremities.    Vital Signs Last 24 Hrs  T(C): 36.7 (31 Oct 2019 07:53), Max: 36.7 (31 Oct 2019 07:53)  T(F): 98.1 (31 Oct 2019 07:53), Max: 98.1 (31 Oct 2019 07:53)  HR: 94 (31 Oct 2019 07:53) (94 - 94)  BP: 138/88 (31 Oct 2019 07:53) (138/88 - 138/88)  RR: 20 (31 Oct 2019 07:53) (20 - 20)      LABS:                        10.6   x     )-----------( x        ( 31 Oct 2019 06:16 )             32.2

## 2019-11-01 NOTE — PROGRESS NOTE ADULT - ASSESSMENT
MADELIN KHALIL is a 25yo  now PPD#2 s/p spontaneous vaginal delivery at 41 weeks gestation. No acute overnight events

## 2019-11-04 PROBLEM — Z78.9 OTHER SPECIFIED HEALTH STATUS: Chronic | Status: ACTIVE | Noted: 2019-10-29

## 2019-11-07 ENCOUNTER — APPOINTMENT (OUTPATIENT)
Dept: OBGYN | Facility: CLINIC | Age: 24
End: 2019-11-07
Payer: COMMERCIAL

## 2019-11-07 VITALS
WEIGHT: 197 LBS | HEIGHT: 68 IN | DIASTOLIC BLOOD PRESSURE: 72 MMHG | SYSTOLIC BLOOD PRESSURE: 112 MMHG | BODY MASS INDEX: 29.86 KG/M2

## 2019-11-07 PROCEDURE — 99213 OFFICE O/P EST LOW 20 MIN: CPT

## 2019-11-07 NOTE — HISTORY OF PRESENT ILLNESS
[Reproductive Age] : is of reproductive age [Pregnancy History] : pregnancy history: [Definite:  ___ (Date)] : the last menstrual period was [unfilled] [Menarche Age: ____] : age at menarche was [unfilled] [Sexually Active] : is sexually active [Monogamous] : is monogamous [Condoms] : uses condoms [Contraception] : uses contraception [Male ___] : [unfilled] male

## 2019-11-07 NOTE — DISCUSSION/SUMMARY
[FreeTextEntry1] : We discussed her feelings of guilt and reassurance was given.  We discussed concrete ways to help within the next week including being around the family and friends who want to offer her help, possibly planning to have meals with family/friends.  Physical care of herself was also discussed to relief her normal post partum discomforts which she feels very limited by. \par \par We discussed options to help her PP blues including talk therapy and/or medication use. She declines medication at this time as she does 'not want to be a zombie'.  She was provided with resources for counseling including [facility] and the [facility] crisis counseling resource.\par \par Her mother was present for the entire conversation as well.\par \par Pt has follow up in 1 week.

## 2019-11-08 LAB — SURGICAL PATHOLOGY STUDY: SIGNIFICANT CHANGE UP

## 2019-11-15 ENCOUNTER — APPOINTMENT (OUTPATIENT)
Dept: OBGYN | Facility: CLINIC | Age: 24
End: 2019-11-15
Payer: COMMERCIAL

## 2019-11-15 VITALS
DIASTOLIC BLOOD PRESSURE: 66 MMHG | BODY MASS INDEX: 29.4 KG/M2 | WEIGHT: 194 LBS | SYSTOLIC BLOOD PRESSURE: 116 MMHG | HEIGHT: 68 IN

## 2019-11-15 LAB
BASOPHILS # BLD AUTO: 0.03 K/UL
BASOPHILS NFR BLD AUTO: 0.3 %
EOSINOPHIL # BLD AUTO: 0.05 K/UL
EOSINOPHIL NFR BLD AUTO: 0.5 %
GLUCOSE 1H P 50 G GLC PO SERPL-MCNC: 109 MG/DL
HCT VFR BLD CALC: 35.3 %
HGB BLD-MCNC: 11.9 G/DL
IMM GRANULOCYTES NFR BLD AUTO: 0.4 %
LYMPHOCYTES # BLD AUTO: 2 K/UL
LYMPHOCYTES NFR BLD AUTO: 18.9 %
MAN DIFF?: NORMAL
MCHC RBC-ENTMCNC: 30.9 PG
MCHC RBC-ENTMCNC: 33.7 GM/DL
MCV RBC AUTO: 91.7 FL
MONOCYTES # BLD AUTO: 0.91 K/UL
MONOCYTES NFR BLD AUTO: 8.6 %
NEUTROPHILS # BLD AUTO: 7.56 K/UL
NEUTROPHILS NFR BLD AUTO: 71.3 %
PLATELET # BLD AUTO: 225 K/UL
RBC # BLD: 3.85 M/UL
RBC # FLD: 12.9 %
WBC # FLD AUTO: 10.59 K/UL

## 2019-11-15 PROCEDURE — 99213 OFFICE O/P EST LOW 20 MIN: CPT | Mod: 24

## 2019-11-15 PROCEDURE — 0503F POSTPARTUM CARE VISIT: CPT

## 2019-11-26 LAB
APPEARANCE: CLEAR
BACTERIA UR CULT: NORMAL
BACTERIA: NEGATIVE
BILIRUBIN URINE: NEGATIVE
BLOOD URINE: NEGATIVE
CALCIUM OXALATE CRYSTALS: ABNORMAL
CANDIDA VAG CYTO: NOT DETECTED
COLOR: YELLOW
G VAGINALIS+PREV SP MTYP VAG QL MICRO: NOT DETECTED
GLUCOSE QUALITATIVE U: NEGATIVE
HYALINE CASTS: 0 /LPF
KETONES URINE: NEGATIVE
LEUKOCYTE ESTERASE URINE: ABNORMAL
MICROSCOPIC-UA: NORMAL
NITRITE URINE: NEGATIVE
PH URINE: 6
PROTEIN URINE: NORMAL
RED BLOOD CELLS URINE: 3 /HPF
SPECIFIC GRAVITY URINE: 1.03
SQUAMOUS EPITHELIAL CELLS: 5 /HPF
T VAGINALIS VAG QL WET PREP: NOT DETECTED
URINE COMMENTS: NORMAL
UROBILINOGEN URINE: NORMAL
WHITE BLOOD CELLS URINE: 16 /HPF

## 2019-12-02 ENCOUNTER — APPOINTMENT (OUTPATIENT)
Dept: OBGYN | Facility: CLINIC | Age: 24
End: 2019-12-02
Payer: COMMERCIAL

## 2019-12-02 VITALS
WEIGHT: 195 LBS | DIASTOLIC BLOOD PRESSURE: 70 MMHG | SYSTOLIC BLOOD PRESSURE: 110 MMHG | HEIGHT: 68 IN | BODY MASS INDEX: 29.55 KG/M2

## 2019-12-02 DIAGNOSIS — Z34.93 ENCOUNTER FOR SUPERVISION OF NORMAL PREGNANCY, UNSPECIFIED, THIRD TRIMESTER: ICD-10-CM

## 2019-12-02 DIAGNOSIS — Z78.9 OTHER SPECIFIED HEALTH STATUS: ICD-10-CM

## 2019-12-02 DIAGNOSIS — O26.843 UTERINE SIZE-DATE DISCREPANCY, THIRD TRIMESTER: ICD-10-CM

## 2019-12-02 DIAGNOSIS — Z3A.40 40 WEEKS GESTATION OF PREGNANCY: ICD-10-CM

## 2019-12-02 DIAGNOSIS — Z82.49 FAMILY HISTORY OF ISCHEMIC HEART DISEASE AND OTHER DISEASES OF THE CIRCULATORY SYSTEM: ICD-10-CM

## 2019-12-02 LAB
HCG UR QL: NEGATIVE
QUALITY CONTROL: YES

## 2019-12-02 PROCEDURE — 81025 URINE PREGNANCY TEST: CPT

## 2019-12-02 PROCEDURE — 0503F POSTPARTUM CARE VISIT: CPT

## 2019-12-30 ENCOUNTER — APPOINTMENT (OUTPATIENT)
Dept: OBGYN | Facility: CLINIC | Age: 24
End: 2019-12-30
Payer: COMMERCIAL

## 2019-12-30 VITALS
BODY MASS INDEX: 29.7 KG/M2 | DIASTOLIC BLOOD PRESSURE: 80 MMHG | SYSTOLIC BLOOD PRESSURE: 120 MMHG | HEIGHT: 68 IN | WEIGHT: 196 LBS

## 2019-12-30 DIAGNOSIS — Z01.419 ENCOUNTER FOR GYNECOLOGICAL EXAMINATION (GENERAL) (ROUTINE) W/OUT ABNORMAL FINDINGS: ICD-10-CM

## 2019-12-30 PROCEDURE — 99395 PREV VISIT EST AGE 18-39: CPT

## 2020-01-06 LAB — HPV HIGH+LOW RISK DNA PNL CVX: NOT DETECTED

## 2020-01-22 LAB — CYTOLOGY CVX/VAG DOC THIN PREP: ABNORMAL

## 2020-01-27 ENCOUNTER — APPOINTMENT (OUTPATIENT)
Dept: OBGYN | Facility: CLINIC | Age: 25
End: 2020-01-27
Payer: COMMERCIAL

## 2020-01-27 VITALS
BODY MASS INDEX: 28.95 KG/M2 | HEIGHT: 68 IN | SYSTOLIC BLOOD PRESSURE: 124 MMHG | DIASTOLIC BLOOD PRESSURE: 82 MMHG | WEIGHT: 191 LBS

## 2020-01-27 LAB
HCG UR QL: NEGATIVE
QUALITY CONTROL: YES

## 2020-01-27 PROCEDURE — 81025 URINE PREGNANCY TEST: CPT

## 2020-01-27 PROCEDURE — 57454 BX/CURETT OF CERVIX W/SCOPE: CPT

## 2020-01-27 NOTE — PROCEDURE
[Colposcopy] : colposcopy [LGSIL] : low grade squamous intraepithelial lesion [Patient] : patient [Risks] : risks [Benefits] : benefits [Alternatives] : alternatives [Infection] : infection [Bleeding] : bleeding [Allergic Reaction] : allergic reaction [Consent Obtained] : written consent was obtained prior to the procedure [No Abnormalities] : no abnormalities [Acetowhite ___ o'clock] : ascetowhite changes at [unfilled] ~Uo'clock [Non-staining ___ o'clock] : no staining at [unfilled] o'clock [Lesion] : lesion(s) seen [Right] : on the right vaginal wall [Biopsies Taken: # ___] : [unfilled] biopsies taken of the cervix [Biopsy Locations ___ o'clock] : the biopsies were taken at [unfilled] o'clock [ECC Done] : Endocervical curettage was performed.  [Moon's] : Moon's solution [Direct Pressure] : direct pressure [Tolerated Well] : the patient tolerated the procedure well [No Complications] : there were no complications

## 2020-01-27 NOTE — HISTORY OF PRESENT ILLNESS
[Good] : being in good health [Healthy Diet] : a healthy diet [Regular Exercise] : regular exercise [Last Pap ___] : Last cervical pap smear was [unfilled] [Reproductive Age] : is of reproductive age [Definite:  ___ (Date)] : the last menstrual period was [unfilled] [Menarche Age: ____] : age at menarche was [unfilled] [Contraception] : uses contraception [Condoms] : uses condoms [Pregnancy History] : pregnancy history: [Total Preg ___] : [unfilled] [Full Term ___] : [unfilled] [Living ___] : [unfilled] [Sexually Active] : is sexually active [Monogamous (Male Partner)] : is monogamous with a male partner [Weight Concerns] : no concerns with her weight [Menstrual Problems] : reports normal menses

## 2020-02-18 ENCOUNTER — APPOINTMENT (OUTPATIENT)
Dept: OBGYN | Facility: CLINIC | Age: 25
End: 2020-02-18
Payer: COMMERCIAL

## 2020-02-18 VITALS
BODY MASS INDEX: 28.95 KG/M2 | SYSTOLIC BLOOD PRESSURE: 124 MMHG | WEIGHT: 191 LBS | DIASTOLIC BLOOD PRESSURE: 80 MMHG | HEIGHT: 68 IN

## 2020-02-18 PROCEDURE — 99213 OFFICE O/P EST LOW 20 MIN: CPT

## 2020-02-27 LAB — CORE LAB BIOPSY: NORMAL

## 2020-02-28 DIAGNOSIS — B96.89 ACUTE VAGINITIS: ICD-10-CM

## 2020-02-28 DIAGNOSIS — N76.0 ACUTE VAGINITIS: ICD-10-CM

## 2020-03-03 LAB
CANDIDA VAG CYTO: NOT DETECTED
G VAGINALIS+PREV SP MTYP VAG QL MICRO: DETECTED
T VAGINALIS VAG QL WET PREP: NOT DETECTED

## 2020-03-03 NOTE — PHYSICAL EXAM
[Awake] : awake [Alert] : alert [Oriented x3] : oriented to person, place, and time [No Lesions] : no genitalia lesions [Labia Majora] : labia major [Normal] : clitoris [Labia Minora] : labia minora [Discharge] : a  ~M vaginal discharge was present [Uterine Adnexae] : were not tender and not enlarged [No Bleeding] : there was no active vaginal bleeding [Acute Distress] : no acute distress [Depressed Mood] : not depressed [Flat Affect] : affect not flat

## 2020-03-03 NOTE — REVIEW OF SYSTEMS
[Nl] : Integumentary [Fever] : no fever [Chills] : no chills [Pelvic Pain] : pelvic pain [Abn Vag Bleeding] : no abnormal vaginal bleeding [Vaginal Discharge] : vaginal discharge [Vaginal Itching] : no vaginal itching [Vaginal Odor] : no vaginal odor

## 2020-03-03 NOTE — END OF VISIT
[FreeTextEntry3] : I, Henrry Paul, acted solely as a scribe for Dr. Abreu on this date 02/18/2020.\par All medical record entries made by the Scribe were at my, Dr. Abreu's direction and personally dictated by me on  02/18/2020. I have reviewed the chart and agree that the record accurately reflects my personal performance of the history, physical exam, assessment and plan. I have also personally directed, reviewed, and agreed with the chart.\par

## 2020-03-03 NOTE — HISTORY OF PRESENT ILLNESS
[Healthy Diet] : a healthy diet [Good] : being in good health [Regular Exercise] : regular exercise [Last Pap ___] : Last cervical pap smear was [unfilled] [Reproductive Age] : is of reproductive age [Pregnancy History] : pregnancy history: [Total Preg ___] : [unfilled] [Full Term ___] : [unfilled] [Living ___] : [unfilled] [Monogamous (Male Partner)] : is monogamous with a male partner [Menarche Age: ____] : age at menarche was [unfilled] [Sexually Active] : is sexually active [Contraception] : uses contraception [Condoms] : uses condoms [Male ___] : [unfilled] male [Weight Concerns] : no concerns with her weight

## 2020-03-28 NOTE — CHIEF COMPLAINT
[Post-Partum Visit] : post-partum visit [FreeTextEntry1] : vaginal 10/30/19\par female "FLORECITA"\par 7LBS 6OZ\par BOTTLE FEEDING

## 2020-03-28 NOTE — CHIEF COMPLAINT
[Post-Partum Visit] : post-partum visit [FreeTextEntry1] : 2 Week Post Partum Vaginal delivery 10/30/2019 Female " Janine" 7.7 Bottle Feeding.\par Depression screening today.

## 2020-03-28 NOTE — HISTORY OF PRESENT ILLNESS
[1 Year Ago] : 1 year ago [Good] : being in good health [Last Pap ___] : Last cervical pap smear was [unfilled] [Pregnancy History] : pregnancy history: [Definite:  ___ (Date)] : the last menstrual period was [unfilled] [Menarche Age: ____] : age at menarche was [unfilled] [Sexually Active] : is not sexually active

## 2020-03-28 NOTE — PHYSICAL EXAM
[Awake] : awake [Alert] : alert [Acute Distress] : no acute distress [Mass] : no breast mass [Nipple Discharge] : no nipple discharge [Axillary LAD] : no axillary lymphadenopathy [Soft] : soft [Tender] : non tender [Distended] : not distended [Oriented x3] : oriented to person, place, and time [Depressed Mood] : not depressed [Flat Affect] : affect not flat [No Lesions] : no genitalia lesions [Labia Majora Erythema] : no erythema of the labia majora [Labia Minora Erythema] : no erythema of the labia minora [Normal] : uterus [Labia Majora] : labia major [Labia Minora] : labia minora [Pink Rugae] : pink rugae [No Bleeding] : there was no active vaginal bleeding [Pap Obtained] : a Pap smear was performed [Motion Tenderness] : there was no cervical motion tenderness [Normal Position] : in a normal position [Tenderness] : nontender [Enlarged ___ wks] : not enlarged [Mass ___ cm] : no uterine mass was palpated [Uterine Adnexae] : were not tender and not enlarged [Adnexa Tenderness] : were not tender [Ovarian Mass (___ Cm)] : there were no adnexal masses [FreeTextEntry4] : Right vaginal wall laceration repair- well healed with some granulation tissue.

## 2020-03-28 NOTE — END OF VISIT
[FreeTextEntry3] : I, Henrry Miller, acted solely as a scribe for Dr. Salomon on this date 12/02/2019.\par All medical record entries made by the Scribe were at my, Dr. Salomon's direction and personally dictated by me on  12/02/2019. I have reviewed the chart and agree that the record accurately reflects my personal performance of the history, physical exam, assessment and plan. I have also personally directed, reviewed, and agreed with the chart.\par

## 2020-03-28 NOTE — PHYSICAL EXAM
[Awake] : awake [Alert] : alert [Examination Of The Breasts] : a normal appearance [No Discharge] : no discharge [No Masses] : no breast masses were palpable [Soft] : soft [Oriented x3] : oriented to person, place, and time [No Lesions] : no genitalia lesions [Labia Majora] : labia major [Labia Minora] : labia minora [Normal] : clitoris [No Bleeding] : there was no active vaginal bleeding [Pink Rugae] : pink rugae [Normal Position] : in a normal position [Uterine Adnexae] : were not tender and not enlarged [Acute Distress] : no acute distress [Mass] : no breast mass [Nipple Discharge] : no nipple discharge [Axillary LAD] : no axillary lymphadenopathy [Tender] : non tender [Distended] : not distended [Depressed Mood] : not depressed [Flat Affect] : affect not flat [Labia Majora Erythema] : no erythema of the labia majora [Labia Minora Erythema] : no erythema of the labia minora [Pap Obtained] : a Pap smear was not performed [Motion Tenderness] : there was no cervical motion tenderness [Tenderness] : nontender [Enlarged ___ wks] : not enlarged [Mass ___ cm] : no uterine mass was palpated [Adnexa Tenderness] : were not tender [Ovarian Mass (___ Cm)] : there were no adnexal masses [de-identified] : midline perineal laceration intact and healing well with no sign of infection [FreeTextEntry4] : right vaginal wall laceration intact and healing well with no sign of infection

## 2020-03-28 NOTE — HISTORY OF PRESENT ILLNESS
[Last Pap ___] : Last cervical pap smear was [unfilled] [Total Preg ___] : [unfilled] [Pregnancy History] : pregnancy history: [Full Term ___] : [unfilled] [Living ___] : [unfilled] [Definite:  ___ (Date)] : the last menstrual period was [unfilled] [Menarche Age: ____] : age at menarche was [unfilled] [Menstrual Problems] : reports normal menses [Sexually Active] : is not sexually active [Contraception] : does not use contraception

## 2020-03-28 NOTE — HISTORY OF PRESENT ILLNESS
[1 Year Ago] : 1 year ago [Good] : being in good health [Regular Exercise] : regular exercise [Healthy Diet] : a healthy diet [No Previous Mammogram] : no previous mammogram [Last Pap ___] : Last cervical pap smear was [unfilled] [Reproductive Age] : is of reproductive age [No Previous Colonoscopy] : no previous colonoscopy [Definite:  ___ (Date)] : the last menstrual period was [unfilled] [Menarche Age: ____] : age at menarche was [unfilled] [Monogamous] : is monogamous [Sexually Active] : is sexually active [Male ___] : [unfilled] male [Contraception] : uses contraception [Total Preg ___] : [unfilled] [Pregnancy History] : pregnancy history: [Full Term ___] : [unfilled] [Living ___] : [unfilled] [Condoms] : uses condoms [Weight Concerns] : no concerns with her weight

## 2020-03-28 NOTE — END OF VISIT
[FreeTextEntry3] : I, Henrry Miller, acted solely as a scribe for Dr. Salomon on this date 11/15/2019.\par All medical record entries made by the Scribe were at my, Dr. Salomon's direction and personally dictated by me on  11/15/2019. I have reviewed the chart and agree that the record accurately reflects my personal performance of the history, physical exam, assessment and plan. I have also personally directed, reviewed, and agreed with the chart.\par \par

## 2020-03-28 NOTE — PHYSICAL EXAM
[Alert] : alert [Awake] : awake [Soft] : soft [Oriented x3] : oriented to person, place, and time [No Lesions] : no genitalia lesions [Labia Minora] : labia minora [Labia Majora] : labia major [Normal] : clitoris [Discharge] : a  ~M vaginal discharge was present [No Bleeding] : there was no active vaginal bleeding [Acute Distress] : no acute distress [Tender] : non tender [Distended] : not distended [Depressed Mood] : not depressed [Flat Affect] : affect not flat [Labia Majora Erythema] : no erythema of the labia majora [Labia Minora Erythema] : no erythema of the labia minora [Scant] : scant [Foul Smelling] : not foul smelling [Farzad] : yellow [FreeTextEntry4] : Laceration repairs: c/d/i with no signs of infection

## 2020-09-25 ENCOUNTER — TRANSCRIPTION ENCOUNTER (OUTPATIENT)
Age: 25
End: 2020-09-25

## 2020-12-14 ENCOUNTER — TRANSCRIPTION ENCOUNTER (OUTPATIENT)
Age: 25
End: 2020-12-14

## 2020-12-23 PROBLEM — Z01.419 ENCOUNTER FOR GYNECOLOGICAL EXAMINATION WITHOUT ABNORMAL FINDING: Status: RESOLVED | Noted: 2020-03-26 | Resolved: 2020-12-23

## 2020-12-23 PROBLEM — N76.0 BACTERIAL VAGINOSIS: Status: RESOLVED | Noted: 2020-02-28 | Resolved: 2020-12-23

## 2020-12-23 PROBLEM — Z01.419 ENCOUNTER FOR ANNUAL ROUTINE GYNECOLOGICAL EXAMINATION: Status: RESOLVED | Noted: 2019-12-30 | Resolved: 2020-12-23

## 2021-01-29 ENCOUNTER — APPOINTMENT (OUTPATIENT)
Dept: OBGYN | Facility: CLINIC | Age: 26
End: 2021-01-29

## 2021-02-16 ENCOUNTER — APPOINTMENT (OUTPATIENT)
Dept: OBGYN | Facility: CLINIC | Age: 26
End: 2021-02-16
Payer: COMMERCIAL

## 2021-02-16 VITALS
HEIGHT: 68 IN | BODY MASS INDEX: 25.91 KG/M2 | SYSTOLIC BLOOD PRESSURE: 112 MMHG | TEMPERATURE: 97.2 F | WEIGHT: 171 LBS | DIASTOLIC BLOOD PRESSURE: 70 MMHG

## 2021-02-16 LAB
HCG UR QL: POSITIVE
QUALITY CONTROL: YES

## 2021-02-16 PROCEDURE — 99072 ADDL SUPL MATRL&STAF TM PHE: CPT

## 2021-02-16 PROCEDURE — 81025 URINE PREGNANCY TEST: CPT

## 2021-02-16 PROCEDURE — 99395 PREV VISIT EST AGE 18-39: CPT

## 2021-02-16 PROCEDURE — 99213 OFFICE O/P EST LOW 20 MIN: CPT | Mod: 25

## 2021-02-16 NOTE — PHYSICAL EXAM
[Appropriately responsive] : appropriately responsive [Alert] : alert [No Acute Distress] : no acute distress [No Lymphadenopathy] : no lymphadenopathy [Soft] : soft [Non-tender] : non-tender [Non-distended] : non-distended [No HSM] : No HSM [No Lesions] : no lesions [No Mass] : no mass [Oriented x3] : oriented x3 [Examination Of The Breasts] : a normal appearance [No Masses] : no breast masses were palpable [Labia Minora] : normal [Labia Majora] : normal [Normal] : normal [Enlarged ___ wks] : enlarged [unfilled] ~Uweeks [Uterine Adnexae] : normal

## 2021-02-16 NOTE — COUNSELING
[Nutrition/ Exercise/ Weight Management] : nutrition, exercise, weight management [Vitamins/Supplements] : vitamins/supplements [Sunscreen] : sunscreen [Drugs/Alcohol] : drugs, alcohol [Breast Self Exam] : breast self exam

## 2021-02-16 NOTE — HISTORY OF PRESENT ILLNESS
[N] : Patient does not use contraception [Y] : Positive pregnancy history [Menarche Age: ____] : age at menarche was [unfilled] [Currently Active] : currently active [Men] : men [No] : No [TextBox_4] : 26yo  LMP 19 presents with a positive pregnancy test\par \par Pt happy and accepting or pregnancy- taking PNV- she nursed for only 2 weeks last pregnancy \par \par Doing well- denies any spotting or pelvic pain\par \par Pt is 7 weeks by LMP\par \par HX uncomplicated  15 mos ago SSH\par \par Last pap LGSIL- had a colposcopy\par \par Will do annual today \par \par Pregnancy precautions given \par \par  [PapSmeardate] : 12/30/2019 [TextBox_31] : LSIL [LMPDate] : 12/28/2020 [PGHxTotal] : 2 [[facility] CampusxFulerm] : 1 [[facility] Hospitaliving] : 1 [FreeTextEntry1] : 12/28/2020

## 2021-02-20 LAB
C TRACH RRNA SPEC QL NAA+PROBE: NOT DETECTED
N GONORRHOEA RRNA SPEC QL NAA+PROBE: NOT DETECTED
SOURCE TP AMPLIFICATION: NORMAL

## 2021-03-04 ENCOUNTER — NON-APPOINTMENT (OUTPATIENT)
Age: 26
End: 2021-03-04

## 2021-03-04 ENCOUNTER — APPOINTMENT (OUTPATIENT)
Dept: OBGYN | Facility: CLINIC | Age: 26
End: 2021-03-04
Payer: COMMERCIAL

## 2021-03-04 ENCOUNTER — ASOB RESULT (OUTPATIENT)
Age: 26
End: 2021-03-04

## 2021-03-04 VITALS
HEIGHT: 68 IN | SYSTOLIC BLOOD PRESSURE: 118 MMHG | DIASTOLIC BLOOD PRESSURE: 72 MMHG | WEIGHT: 171 LBS | BODY MASS INDEX: 25.91 KG/M2

## 2021-03-04 LAB
BILIRUB UR QL STRIP: NORMAL
GLUCOSE UR-MCNC: NORMAL
HCG UR QL: 0.2 EU/DL
HGB UR QL STRIP.AUTO: NORMAL
KETONES UR-MCNC: NORMAL
LEUKOCYTE ESTERASE UR QL STRIP: NORMAL
NITRITE UR QL STRIP: NORMAL
PH UR STRIP: 7
PROT UR STRIP-MCNC: NORMAL
SP GR UR STRIP: 1.02

## 2021-03-04 PROCEDURE — 0500F INITIAL PRENATAL CARE VISIT: CPT

## 2021-03-04 PROCEDURE — 76817 TRANSVAGINAL US OBSTETRIC: CPT

## 2021-03-04 PROCEDURE — 36415 COLL VENOUS BLD VENIPUNCTURE: CPT

## 2021-03-04 PROCEDURE — 99072 ADDL SUPL MATRL&STAF TM PHE: CPT

## 2021-03-15 ENCOUNTER — NON-APPOINTMENT (OUTPATIENT)
Age: 26
End: 2021-03-15

## 2021-03-15 LAB — CYTOLOGY CVX/VAG DOC THIN PREP: ABNORMAL

## 2021-03-25 ENCOUNTER — APPOINTMENT (OUTPATIENT)
Dept: OBGYN | Facility: CLINIC | Age: 26
End: 2021-03-25
Payer: COMMERCIAL

## 2021-03-25 ENCOUNTER — NON-APPOINTMENT (OUTPATIENT)
Age: 26
End: 2021-03-25

## 2021-03-25 ENCOUNTER — ASOB RESULT (OUTPATIENT)
Age: 26
End: 2021-03-25

## 2021-03-25 VITALS
DIASTOLIC BLOOD PRESSURE: 64 MMHG | HEIGHT: 68 IN | SYSTOLIC BLOOD PRESSURE: 120 MMHG | BODY MASS INDEX: 26.07 KG/M2 | WEIGHT: 172 LBS

## 2021-03-25 LAB
ABO + RH PNL BLD: NORMAL
B19V IGG SER QL IA: >9.99 INDEX
B19V IGG+IGM SER-IMP: NORMAL
B19V IGG+IGM SER-IMP: POSITIVE
B19V IGM FLD-ACNC: 0.24 INDEX
B19V IGM SER-ACNC: NEGATIVE
BACTERIA UR CULT: NORMAL
BASOPHILS # BLD AUTO: 0.05 K/UL
BASOPHILS NFR BLD AUTO: 0.7 %
BILIRUB UR QL STRIP: NORMAL
BLD GP AB SCN SERPL QL: NORMAL
CMV IGG SERPL QL: <0.2 U/ML
CMV IGG SERPL-IMP: NEGATIVE
CMV IGM SERPL QL: <8 AU/ML
CMV IGM SERPL QL: NEGATIVE
EOSINOPHIL # BLD AUTO: 0.04 K/UL
EOSINOPHIL NFR BLD AUTO: 0.6 %
ESTIMATED AVERAGE GLUCOSE: 100 MG/DL
GLUCOSE UR-MCNC: NORMAL
HBA1C MFR BLD HPLC: 5.1 %
HBV SURFACE AG SER QL: NONREACTIVE
HCG UR QL: 0.2 EU/DL
HCT VFR BLD CALC: 42.7 %
HGB A MFR BLD: 97.5 %
HGB A2 MFR BLD: 2.5 %
HGB BLD-MCNC: 14.2 G/DL
HGB FRACT BLD-IMP: NORMAL
HGB UR QL STRIP.AUTO: NORMAL
HIV1+2 AB SPEC QL IA.RAPID: NONREACTIVE
IMM GRANULOCYTES NFR BLD AUTO: 0.3 %
KETONES UR-MCNC: NORMAL
LEUKOCYTE ESTERASE UR QL STRIP: NORMAL
LYMPHOCYTES # BLD AUTO: 1.89 K/UL
LYMPHOCYTES NFR BLD AUTO: 26.1 %
M TB IFN-G BLD-IMP: NEGATIVE
MAN DIFF?: NORMAL
MCHC RBC-ENTMCNC: 30.1 PG
MCHC RBC-ENTMCNC: 33.3 GM/DL
MCV RBC AUTO: 90.5 FL
MEV IGG FLD QL IA: 203 AU/ML
MEV IGG+IGM SER-IMP: POSITIVE
MEV IGM SER QL: <0.91 ISR
MONOCYTES # BLD AUTO: 0.5 K/UL
MONOCYTES NFR BLD AUTO: 6.9 %
NEUTROPHILS # BLD AUTO: 4.75 K/UL
NEUTROPHILS NFR BLD AUTO: 65.4 %
NITRITE UR QL STRIP: NORMAL
PH UR STRIP: 8.5
PLATELET # BLD AUTO: 284 K/UL
PROT UR STRIP-MCNC: NORMAL
QUANTIFERON TB PLUS MITOGEN MINUS NIL: 8.59 IU/ML
QUANTIFERON TB PLUS NIL: 0.02 IU/ML
QUANTIFERON TB PLUS TB1 MINUS NIL: 0 IU/ML
QUANTIFERON TB PLUS TB2 MINUS NIL: -0.01 IU/ML
RBC # BLD: 4.72 M/UL
RBC # FLD: 12.1 %
RUBV IGG FLD-ACNC: 8.8 INDEX
RUBV IGG SER-IMP: POSITIVE
SP GR UR STRIP: 1.02
T GONDII AB SER-IMP: NEGATIVE
T GONDII AB SER-IMP: NEGATIVE
T GONDII IGG SER QL: <3 IU/ML
T GONDII IGM SER QL: <3 AU/ML
T PALLIDUM AB SER QL IA: NEGATIVE
TSH SERPL-ACNC: 1.12 UIU/ML
VZV AB TITR SER: POSITIVE
VZV IGG SER IF-ACNC: 221.3 INDEX
WBC # FLD AUTO: 7.25 K/UL

## 2021-03-25 PROCEDURE — 0502F SUBSEQUENT PRENATAL CARE: CPT

## 2021-03-25 PROCEDURE — 76813 OB US NUCHAL MEAS 1 GEST: CPT

## 2021-03-25 PROCEDURE — 36415 COLL VENOUS BLD VENIPUNCTURE: CPT

## 2021-03-25 RX ORDER — METRONIDAZOLE 500 MG/1
500 TABLET ORAL TWICE DAILY
Qty: 14 | Refills: 0 | Status: DISCONTINUED | COMMUNITY
Start: 2020-02-28 | End: 2021-03-04

## 2021-04-01 LAB
1ST TRIMESTER DATA: NORMAL
ADDENDUM DOC: NORMAL
AFP PNL SERPL: NORMAL
AFP SERPL-ACNC: NORMAL
CLINICAL BIOCHEMIST REVIEW: NORMAL
FREE BETA HCG 1ST TRIMESTER: NORMAL
Lab: NORMAL
NASAL BONE: PRESENT
NOTES NTD: NORMAL
NT: NORMAL
PAPP-A SERPL-ACNC: NORMAL
TRISOMY 18/3: NORMAL

## 2021-04-29 ENCOUNTER — NON-APPOINTMENT (OUTPATIENT)
Age: 26
End: 2021-04-29

## 2021-04-29 ENCOUNTER — APPOINTMENT (OUTPATIENT)
Dept: OBGYN | Facility: CLINIC | Age: 26
End: 2021-04-29
Payer: COMMERCIAL

## 2021-04-29 VITALS
SYSTOLIC BLOOD PRESSURE: 112 MMHG | BODY MASS INDEX: 26.67 KG/M2 | WEIGHT: 176 LBS | HEIGHT: 68 IN | DIASTOLIC BLOOD PRESSURE: 72 MMHG

## 2021-04-29 PROCEDURE — 0502F SUBSEQUENT PRENATAL CARE: CPT

## 2021-04-29 PROCEDURE — 36415 COLL VENOUS BLD VENIPUNCTURE: CPT

## 2021-05-13 ENCOUNTER — NON-APPOINTMENT (OUTPATIENT)
Age: 26
End: 2021-05-13

## 2021-05-13 ENCOUNTER — APPOINTMENT (OUTPATIENT)
Dept: ANTEPARTUM | Facility: CLINIC | Age: 26
End: 2021-05-13
Payer: COMMERCIAL

## 2021-05-13 ENCOUNTER — ASOB RESULT (OUTPATIENT)
Age: 26
End: 2021-05-13

## 2021-05-13 PROCEDURE — 99072 ADDL SUPL MATRL&STAF TM PHE: CPT

## 2021-05-13 PROCEDURE — 76805 OB US >/= 14 WKS SNGL FETUS: CPT

## 2021-05-26 ENCOUNTER — NON-APPOINTMENT (OUTPATIENT)
Age: 26
End: 2021-05-26

## 2021-05-27 ENCOUNTER — APPOINTMENT (OUTPATIENT)
Dept: OBGYN | Facility: CLINIC | Age: 26
End: 2021-05-27
Payer: COMMERCIAL

## 2021-05-27 ENCOUNTER — NON-APPOINTMENT (OUTPATIENT)
Age: 26
End: 2021-05-27

## 2021-05-27 VITALS
SYSTOLIC BLOOD PRESSURE: 122 MMHG | WEIGHT: 184 LBS | DIASTOLIC BLOOD PRESSURE: 74 MMHG | BODY MASS INDEX: 27.89 KG/M2 | HEIGHT: 68 IN

## 2021-05-27 PROCEDURE — 0502F SUBSEQUENT PRENATAL CARE: CPT

## 2021-06-17 ENCOUNTER — NON-APPOINTMENT (OUTPATIENT)
Age: 26
End: 2021-06-17

## 2021-06-18 ENCOUNTER — TRANSCRIPTION ENCOUNTER (OUTPATIENT)
Age: 26
End: 2021-06-18

## 2021-07-01 ENCOUNTER — NON-APPOINTMENT (OUTPATIENT)
Age: 26
End: 2021-07-01

## 2021-07-01 ENCOUNTER — APPOINTMENT (OUTPATIENT)
Dept: OBGYN | Facility: CLINIC | Age: 26
End: 2021-07-01
Payer: COMMERCIAL

## 2021-07-01 VITALS
WEIGHT: 187 LBS | SYSTOLIC BLOOD PRESSURE: 106 MMHG | BODY MASS INDEX: 28.34 KG/M2 | HEIGHT: 68 IN | DIASTOLIC BLOOD PRESSURE: 74 MMHG

## 2021-07-01 LAB
1ST TRIMESTER DATA: NORMAL
2ND TRIMESTER DATA: NORMAL
AFP PNL SERPL: NORMAL
AFP SERPL-ACNC: NORMAL
AFP SERPL-ACNC: NORMAL
B-HCG FREE SERPL-MCNC: NORMAL
BILIRUB UR QL STRIP: NORMAL
CLINICAL BIOCHEMIST REVIEW: NORMAL
FREE BETA HCG 1ST TRIMESTER: NORMAL
GLUCOSE UR-MCNC: NORMAL
HCG UR QL: 1 EU/DL
HGB UR QL STRIP.AUTO: NORMAL
INHIBIN A SERPL-MCNC: NORMAL
KETONES UR-MCNC: NORMAL
LEUKOCYTE ESTERASE UR QL STRIP: NORMAL
NASAL BONE: PRESENT
NITRITE UR QL STRIP: NORMAL
NOTES NTD: NORMAL
NT: NORMAL
PAPP-A SERPL-ACNC: NORMAL
PH UR STRIP: 7
PROT UR STRIP-MCNC: NORMAL
SP GR UR STRIP: 1.02
U ESTRIOL SERPL-SCNC: NORMAL

## 2021-07-01 PROCEDURE — 0502F SUBSEQUENT PRENATAL CARE: CPT

## 2021-07-01 PROCEDURE — 36415 COLL VENOUS BLD VENIPUNCTURE: CPT

## 2021-07-07 ENCOUNTER — NON-APPOINTMENT (OUTPATIENT)
Age: 26
End: 2021-07-07

## 2021-07-07 LAB
BASOPHILS # BLD AUTO: 0.02 K/UL
BASOPHILS NFR BLD AUTO: 0.3 %
EOSINOPHIL # BLD AUTO: 0.06 K/UL
EOSINOPHIL NFR BLD AUTO: 0.8 %
GLUCOSE 1H P 50 G GLC PO SERPL-MCNC: 138 MG/DL
HCT VFR BLD CALC: 36.2 %
HGB BLD-MCNC: 11.5 G/DL
IMM GRANULOCYTES NFR BLD AUTO: 0.3 %
LYMPHOCYTES # BLD AUTO: 1.43 K/UL
LYMPHOCYTES NFR BLD AUTO: 19.9 %
MAN DIFF?: NORMAL
MCHC RBC-ENTMCNC: 31.1 PG
MCHC RBC-ENTMCNC: 31.8 GM/DL
MCV RBC AUTO: 97.8 FL
MONOCYTES # BLD AUTO: 0.62 K/UL
MONOCYTES NFR BLD AUTO: 8.6 %
NEUTROPHILS # BLD AUTO: 5.03 K/UL
NEUTROPHILS NFR BLD AUTO: 70.1 %
PLATELET # BLD AUTO: 221 K/UL
RBC # BLD: 3.7 M/UL
RBC # FLD: 13.4 %
WBC # FLD AUTO: 7.18 K/UL

## 2021-07-09 ENCOUNTER — NON-APPOINTMENT (OUTPATIENT)
Age: 26
End: 2021-07-09

## 2021-07-14 ENCOUNTER — NON-APPOINTMENT (OUTPATIENT)
Age: 26
End: 2021-07-14

## 2021-07-15 ENCOUNTER — NON-APPOINTMENT (OUTPATIENT)
Age: 26
End: 2021-07-15

## 2021-07-15 ENCOUNTER — APPOINTMENT (OUTPATIENT)
Dept: OBGYN | Facility: CLINIC | Age: 26
End: 2021-07-15
Payer: COMMERCIAL

## 2021-07-15 VITALS
HEIGHT: 68 IN | DIASTOLIC BLOOD PRESSURE: 60 MMHG | BODY MASS INDEX: 28.95 KG/M2 | SYSTOLIC BLOOD PRESSURE: 102 MMHG | WEIGHT: 191 LBS

## 2021-07-15 DIAGNOSIS — Z01.411 ENCOUNTER FOR GYNECOLOGICAL EXAMINATION (GENERAL) (ROUTINE) WITH ABNORMAL FINDINGS: ICD-10-CM

## 2021-07-15 DIAGNOSIS — Z34.91 ENCOUNTER FOR SUPERVISION OF NORMAL PREGNANCY, UNSPECIFIED, FIRST TRIMESTER: ICD-10-CM

## 2021-07-15 DIAGNOSIS — Z32.01 ENCOUNTER FOR PREGNANCY TEST, RESULT POSITIVE: ICD-10-CM

## 2021-07-15 DIAGNOSIS — Z34.92 ENCOUNTER FOR SUPERVISION OF NORMAL PREGNANCY, UNSPECIFIED, SECOND TRIMESTER: ICD-10-CM

## 2021-07-15 LAB
BILIRUB UR QL STRIP: NEGATIVE
GLUCOSE 1H P 100 G GLC PO SERPL-MCNC: 149 MG/DL
GLUCOSE 2H P CHAL SERPL-MCNC: 135 MG/DL
GLUCOSE 3H P CHAL SERPL-MCNC: 118 MG/DL
GLUCOSE BS SERPL-MCNC: 88 MG/DL
GLUCOSE UR-MCNC: NEGATIVE
HCG UR QL: 0.2 EU/DL
HGB UR QL STRIP.AUTO: NEGATIVE
KETONES UR-MCNC: NEGATIVE
LEUKOCYTE ESTERASE UR QL STRIP: NEGATIVE
NITRITE UR QL STRIP: NEGATIVE
PH UR STRIP: 6.5
PROT UR STRIP-MCNC: NEGATIVE
SP GR UR STRIP: 1.02

## 2021-07-15 PROCEDURE — 0502F SUBSEQUENT PRENATAL CARE: CPT

## 2021-08-12 ENCOUNTER — APPOINTMENT (OUTPATIENT)
Dept: OBGYN | Facility: CLINIC | Age: 26
End: 2021-08-12
Payer: COMMERCIAL

## 2021-08-12 ENCOUNTER — MED ADMIN CHARGE (OUTPATIENT)
Age: 26
End: 2021-08-12

## 2021-08-12 ENCOUNTER — ASOB RESULT (OUTPATIENT)
Age: 26
End: 2021-08-12

## 2021-08-12 VITALS
SYSTOLIC BLOOD PRESSURE: 124 MMHG | WEIGHT: 196 LBS | HEIGHT: 68 IN | DIASTOLIC BLOOD PRESSURE: 80 MMHG | BODY MASS INDEX: 29.7 KG/M2

## 2021-08-12 DIAGNOSIS — Z87.42 PERSONAL HISTORY OF OTHER DISEASES OF THE FEMALE GENITAL TRACT: ICD-10-CM

## 2021-08-12 DIAGNOSIS — Z12.4 ENCOUNTER FOR SCREENING FOR MALIGNANT NEOPLASM OF CERVIX: ICD-10-CM

## 2021-08-12 PROCEDURE — 90715 TDAP VACCINE 7 YRS/> IM: CPT

## 2021-08-12 PROCEDURE — 90471 IMMUNIZATION ADMIN: CPT

## 2021-08-12 PROCEDURE — 0502F SUBSEQUENT PRENATAL CARE: CPT

## 2021-08-12 PROCEDURE — 76816 OB US FOLLOW-UP PER FETUS: CPT

## 2021-08-13 LAB
BILIRUB UR QL STRIP: NORMAL
GLUCOSE UR-MCNC: NORMAL
HCG UR QL: 1 EU/DL
HGB UR QL STRIP.AUTO: NORMAL
KETONES UR-MCNC: NORMAL
LEUKOCYTE ESTERASE UR QL STRIP: NORMAL
NITRITE UR QL STRIP: NORMAL
PH UR STRIP: 7
PROT UR STRIP-MCNC: NORMAL
SP GR UR STRIP: 1.02

## 2021-08-26 ENCOUNTER — APPOINTMENT (OUTPATIENT)
Dept: OBGYN | Facility: CLINIC | Age: 26
End: 2021-08-26
Payer: COMMERCIAL

## 2021-08-26 VITALS
WEIGHT: 200 LBS | BODY MASS INDEX: 30.31 KG/M2 | HEIGHT: 68 IN | DIASTOLIC BLOOD PRESSURE: 78 MMHG | SYSTOLIC BLOOD PRESSURE: 124 MMHG

## 2021-08-26 PROCEDURE — 0502F SUBSEQUENT PRENATAL CARE: CPT

## 2021-08-29 LAB
BILIRUB UR QL STRIP: NORMAL
GLUCOSE UR-MCNC: NORMAL
HCG UR QL: 0.2 EU/DL
HGB UR QL STRIP.AUTO: NORMAL
KETONES UR-MCNC: NORMAL
LEUKOCYTE ESTERASE UR QL STRIP: NORMAL
NITRITE UR QL STRIP: NORMAL
PH UR STRIP: 7
PROT UR STRIP-MCNC: NORMAL
SP GR UR STRIP: 1.01

## 2021-09-01 NOTE — DISCHARGE NOTE OB - HOSPITAL COURSE
no
Patient is a 23yo  delivered via spontaneous vaginal delivery. She was transferred to postpartum unit without complications during her stay. Upon discharge she is voiding, tolerating PO, ambulating, and pain is well controlled.

## 2021-09-09 ENCOUNTER — NON-APPOINTMENT (OUTPATIENT)
Age: 26
End: 2021-09-09

## 2021-09-09 ENCOUNTER — APPOINTMENT (OUTPATIENT)
Dept: OBGYN | Facility: CLINIC | Age: 26
End: 2021-09-09
Payer: COMMERCIAL

## 2021-09-09 VITALS
BODY MASS INDEX: 30.77 KG/M2 | WEIGHT: 203 LBS | DIASTOLIC BLOOD PRESSURE: 68 MMHG | SYSTOLIC BLOOD PRESSURE: 122 MMHG | HEIGHT: 68 IN

## 2021-09-09 PROCEDURE — 36415 COLL VENOUS BLD VENIPUNCTURE: CPT

## 2021-09-09 PROCEDURE — 0502F SUBSEQUENT PRENATAL CARE: CPT

## 2021-09-11 LAB
HIV1+2 AB SPEC QL IA.RAPID: NONREACTIVE
MEV IGG FLD QL IA: 255 AU/ML
MEV IGG+IGM SER-IMP: POSITIVE

## 2021-09-14 LAB — B-HEM STREP SPEC QL CULT: NORMAL

## 2021-09-16 ENCOUNTER — APPOINTMENT (OUTPATIENT)
Dept: OBGYN | Facility: CLINIC | Age: 26
End: 2021-09-16
Payer: COMMERCIAL

## 2021-09-16 VITALS
SYSTOLIC BLOOD PRESSURE: 120 MMHG | HEIGHT: 68 IN | DIASTOLIC BLOOD PRESSURE: 80 MMHG | WEIGHT: 204 LBS | BODY MASS INDEX: 30.92 KG/M2

## 2021-09-16 LAB
BILIRUB UR QL STRIP: NORMAL
GLUCOSE UR-MCNC: NORMAL
HCG UR QL: 0.2 EU/DL
HGB UR QL STRIP.AUTO: NORMAL
KETONES UR-MCNC: NORMAL
LEUKOCYTE ESTERASE UR QL STRIP: ABNORMAL
NITRITE UR QL STRIP: NORMAL
PH UR STRIP: 7
PROT UR STRIP-MCNC: NORMAL
SP GR UR STRIP: 1.02

## 2021-09-16 PROCEDURE — 0502F SUBSEQUENT PRENATAL CARE: CPT

## 2021-09-17 ENCOUNTER — TRANSCRIPTION ENCOUNTER (OUTPATIENT)
Age: 26
End: 2021-09-17

## 2021-09-23 ENCOUNTER — APPOINTMENT (OUTPATIENT)
Dept: OBGYN | Facility: CLINIC | Age: 26
End: 2021-09-23
Payer: COMMERCIAL

## 2021-09-23 VITALS
BODY MASS INDEX: 31.07 KG/M2 | HEIGHT: 68 IN | WEIGHT: 205 LBS | SYSTOLIC BLOOD PRESSURE: 122 MMHG | DIASTOLIC BLOOD PRESSURE: 82 MMHG

## 2021-09-23 DIAGNOSIS — Z34.93 ENCOUNTER FOR SUPERVISION OF NORMAL PREGNANCY, UNSPECIFIED, THIRD TRIMESTER: ICD-10-CM

## 2021-09-23 PROCEDURE — 0502F SUBSEQUENT PRENATAL CARE: CPT

## 2021-09-24 LAB
BILIRUB UR QL STRIP: NEGATIVE
GLUCOSE UR-MCNC: NEGATIVE
HCG UR QL: 0.2 EU/DL
HGB UR QL STRIP.AUTO: NEGATIVE
KETONES UR-MCNC: NEGATIVE
LEUKOCYTE ESTERASE UR QL STRIP: NEGATIVE
NITRITE UR QL STRIP: NEGATIVE
PH UR STRIP: 7
PROT UR STRIP-MCNC: NEGATIVE
SP GR UR STRIP: 1.02

## 2021-09-28 PROBLEM — Z34.93 THIRD TRIMESTER PREGNANCY: Status: RESOLVED | Noted: 2021-07-15 | Resolved: 2021-09-28

## 2021-09-30 ENCOUNTER — APPOINTMENT (OUTPATIENT)
Dept: OBGYN | Facility: CLINIC | Age: 26
End: 2021-09-30
Payer: COMMERCIAL

## 2021-09-30 ENCOUNTER — NON-APPOINTMENT (OUTPATIENT)
Age: 26
End: 2021-09-30

## 2021-09-30 VITALS
HEIGHT: 68 IN | WEIGHT: 208 LBS | DIASTOLIC BLOOD PRESSURE: 70 MMHG | BODY MASS INDEX: 31.52 KG/M2 | SYSTOLIC BLOOD PRESSURE: 122 MMHG

## 2021-09-30 PROCEDURE — 0502F SUBSEQUENT PRENATAL CARE: CPT

## 2021-10-04 ENCOUNTER — APPOINTMENT (OUTPATIENT)
Dept: OBGYN | Facility: CLINIC | Age: 26
End: 2021-10-04
Payer: COMMERCIAL

## 2021-10-04 ENCOUNTER — NON-APPOINTMENT (OUTPATIENT)
Age: 26
End: 2021-10-04

## 2021-10-04 ENCOUNTER — ASOB RESULT (OUTPATIENT)
Age: 26
End: 2021-10-04

## 2021-10-04 VITALS
BODY MASS INDEX: 31.49 KG/M2 | SYSTOLIC BLOOD PRESSURE: 122 MMHG | WEIGHT: 207.8 LBS | HEIGHT: 68 IN | DIASTOLIC BLOOD PRESSURE: 82 MMHG

## 2021-10-04 PROCEDURE — 76818 FETAL BIOPHYS PROFILE W/NST: CPT

## 2021-10-04 PROCEDURE — 76816 OB US FOLLOW-UP PER FETUS: CPT | Mod: 59

## 2021-10-04 PROCEDURE — 0502F SUBSEQUENT PRENATAL CARE: CPT

## 2021-10-05 RX ORDER — OXYTOCIN 10 UNIT/ML
333.33 VIAL (ML) INJECTION
Qty: 20 | Refills: 0 | Status: COMPLETED | OUTPATIENT
Start: 2021-10-08 | End: 2021-10-08

## 2021-10-05 RX ORDER — SODIUM CHLORIDE 9 MG/ML
1000 INJECTION, SOLUTION INTRAVENOUS
Refills: 0 | Status: DISCONTINUED | OUTPATIENT
Start: 2021-10-08 | End: 2021-10-08

## 2021-10-05 NOTE — OB PROVIDER H&P - TERM DELIVERIES, OB PROFILE
-- DO NOT REPLY / DO NOT REPLY ALL --  -- Message is from the [facility]--    COVID-19 Universal Screening: N/A - Not about scheduling    General Patient Message      Reason for Call: Marc from [facility] wants to see if the paperwork for patient's diabetic shoes was received. Please contact Marc at [phone] and advise    Caller Information       Type Contact Phone    09/24/2020 01:46 PM CDT Phone (Incoming) Marc (Other) [phone]     [facility]          Alternative phone number: None    Turnaround time given to caller:   \"This message will be sent to [state Provider's name]. The clinical team will fulfill your request as soon as they review your message.\"    
1

## 2021-10-05 NOTE — OB PROVIDER H&P - ASSESSMENT
Patient is a 26 year old  at 40w4d admitted to L&D for eIOL at term.  Patient is a 26 year old  at 40w4d admitted to L&D for [facility] at term.     A/P:   -Admit to L&D  -Consent  -Admission labs  -NPO, except ice chips   -IV fluids  -Fetus: Cat 1 tracing. Continuous toco and fetal monitoring.   -GBS: Negative, no GBS ppx required   -Analgesia: epidural PRN  -Induction method: pitocin    Discussed with Dr. Lopes

## 2021-10-05 NOTE — OB PROVIDER H&P - NSOBVTERISKASSESS_OBGYN_ALL_OB_FT
Patient alert and oriented. Assessment completed. Medications administered. Patient has no complaints of pain at this present time. Patient able to complete activities of daily living on here own. Patient has not had a bowel movement. Miralax given. Will continue to monitor patient progress. OBAntePartum Assessment Completed on: 08-Oct-2021 06:04

## 2021-10-05 NOTE — OB PROVIDER H&P - HISTORY OF PRESENT ILLNESS
Patient is a 26 year old  at 40w4d who presents to L&D for eIOL at term.     LEXY: 10/4/21   LMP: 20     Pregnancy course uncomplicated.     Obhx:   G1: 2019, , 41wks, 7lbs 7oz   Gynhx: LSIL, BV   Pmhx: denies   Pshx: denies   Meds: PNV   Allergies: NKDA   SocialHx: denies        Ultrasound: Vertex, Posterior   EFW: 3173g  Patient is a 26 year old  at 40w4d who presents to L&D for eIOL at term. + fetal movement, - loss of fluid, - vaginal bleeding. Endorses irregular ctx. States she was 3 cm in the office yesterday.    LEXY: 10/4/21   LMP: 20     Pregnancy course uncomplicated.     Obhx:   G1: 2019, , 41wks, 7lbs 7oz   Gynhx: LSIL, BV   Pmhx: denies   Pshx: denies   Meds: PNV   Allergies: NKDA   SocialHx: denies     BMI:   Ultrasound: Vertex, Posterior   EFW: 4120g Patient is a 26 year old  at 40w4d who presents to L&D for term induction of labor. + fetal movement, - loss of fluid, - vaginal bleeding. Endorses irregular ctx. States she was 3 cm in the office yesterday.    LEXY: 10/4/21   LMP: 20     Pregnancy course uncomplicated.     Obhx:   G1: 2019, , 41wks, 7lbs 7oz   Gynhx: LSIL, BV   Pmhx: denies   Pshx: denies   Meds: PNV   Allergies: NKDA   SocialHx: denies     BMI:   Ultrasound: Vertex, Posterior   EFW: 4120g

## 2021-10-05 NOTE — OB PROVIDER H&P - ATTENDING COMMENTS
patient admitted for term induction of labor. Reports uncomplicated pregnancy. Patient was counseled about induction prior to her admission by her primary OB.  Patient with favorable cervix - plan for pitocin.    FHRT reassuring  GBS negative patient admitted for term induction of labor. Reports uncomplicated pregnancy. Patient was counseled about induction prior to her admission by her primary OB.  Patient with favorable cervix - plan for pitocin.    FHRT reassuring  GBS negative  COVID pending

## 2021-10-05 NOTE — OB PROVIDER H&P - NSHPPHYSICALEXAM_GEN_ALL_CORE
Vitals:  Vital Signs Last 24 Hrs  T(C): 36.9 (08 Oct 2021 05:35), Max: 36.9 (08 Oct 2021 05:13)  T(F): 98.42 (08 Oct 2021 05:35), Max: 98.42 (08 Oct 2021 05:35)  HR: 100 (08 Oct 2021 05:35) (100 - 100)  BP: 133/86 (08 Oct 2021 05:35) (133/86 - 133/86)  RR: 18 (08 Oct 2021 05:35) (18 - 18)    Gen: well-appearing, NAD  Resp: breathing comfortably on RA  Abd: nontender, gravid  SVE: 3/40/-3    Bedside sono: cephalic  FHT: baseline 145, mod variability, +accels, -decels   Ringoes: irregular

## 2021-10-07 ENCOUNTER — NON-APPOINTMENT (OUTPATIENT)
Age: 26
End: 2021-10-07

## 2021-10-07 ENCOUNTER — APPOINTMENT (OUTPATIENT)
Dept: OBGYN | Facility: CLINIC | Age: 26
End: 2021-10-07
Payer: COMMERCIAL

## 2021-10-07 ENCOUNTER — ASOB RESULT (OUTPATIENT)
Age: 26
End: 2021-10-07

## 2021-10-07 VITALS
HEIGHT: 68 IN | WEIGHT: 209 LBS | BODY MASS INDEX: 31.67 KG/M2 | SYSTOLIC BLOOD PRESSURE: 122 MMHG | DIASTOLIC BLOOD PRESSURE: 80 MMHG

## 2021-10-07 PROCEDURE — 0502F SUBSEQUENT PRENATAL CARE: CPT

## 2021-10-07 PROCEDURE — 76818 FETAL BIOPHYS PROFILE W/NST: CPT

## 2021-10-08 ENCOUNTER — INPATIENT (INPATIENT)
Facility: HOSPITAL | Age: 26
LOS: 0 days | Discharge: HOME | End: 2021-10-09
Attending: STUDENT IN AN ORGANIZED HEALTH CARE EDUCATION/TRAINING PROGRAM | Admitting: STUDENT IN AN ORGANIZED HEALTH CARE EDUCATION/TRAINING PROGRAM
Payer: COMMERCIAL

## 2021-10-08 ENCOUNTER — APPOINTMENT (OUTPATIENT)
Dept: OBGYN | Facility: HOSPITAL | Age: 26
End: 2021-10-08

## 2021-10-08 ENCOUNTER — TRANSCRIPTION ENCOUNTER (OUTPATIENT)
Age: 26
End: 2021-10-08

## 2021-10-08 VITALS
RESPIRATION RATE: 18 BRPM | WEIGHT: 207.9 LBS | HEIGHT: 68 IN | HEART RATE: 100 BPM | TEMPERATURE: 98 F | SYSTOLIC BLOOD PRESSURE: 133 MMHG | DIASTOLIC BLOOD PRESSURE: 86 MMHG

## 2021-10-08 LAB
BASOPHILS # BLD AUTO: 0.02 K/UL — SIGNIFICANT CHANGE UP (ref 0–0.2)
BASOPHILS NFR BLD AUTO: 0.2 % — SIGNIFICANT CHANGE UP (ref 0–2)
BLD GP AB SCN SERPL QL: SIGNIFICANT CHANGE UP
COVID-19 SPIKE DOMAIN AB INTERP: NEGATIVE — SIGNIFICANT CHANGE UP
COVID-19 SPIKE DOMAIN ANTIBODY RESULT: 0.4 U/ML — SIGNIFICANT CHANGE UP
EOSINOPHIL # BLD AUTO: 0.09 K/UL — SIGNIFICANT CHANGE UP (ref 0–0.5)
EOSINOPHIL NFR BLD AUTO: 1.1 % — SIGNIFICANT CHANGE UP (ref 0–6)
HCT VFR BLD CALC: 37.7 % — SIGNIFICANT CHANGE UP (ref 34.5–45)
HGB BLD-MCNC: 12.9 G/DL — SIGNIFICANT CHANGE UP (ref 11.5–15.5)
IMM GRANULOCYTES NFR BLD AUTO: 0.4 % — SIGNIFICANT CHANGE UP (ref 0–1.5)
LYMPHOCYTES # BLD AUTO: 2.29 K/UL — SIGNIFICANT CHANGE UP (ref 1–3.3)
LYMPHOCYTES # BLD AUTO: 28.4 % — SIGNIFICANT CHANGE UP (ref 13–44)
MCHC RBC-ENTMCNC: 30.4 PG — SIGNIFICANT CHANGE UP (ref 27–34)
MCHC RBC-ENTMCNC: 34.2 GM/DL — SIGNIFICANT CHANGE UP (ref 32–36)
MCV RBC AUTO: 88.7 FL — SIGNIFICANT CHANGE UP (ref 80–100)
MONOCYTES # BLD AUTO: 0.79 K/UL — SIGNIFICANT CHANGE UP (ref 0–0.9)
MONOCYTES NFR BLD AUTO: 9.8 % — SIGNIFICANT CHANGE UP (ref 2–14)
NEUTROPHILS # BLD AUTO: 4.83 K/UL — SIGNIFICANT CHANGE UP (ref 1.8–7.4)
NEUTROPHILS NFR BLD AUTO: 60.1 % — SIGNIFICANT CHANGE UP (ref 43–77)
PLATELET # BLD AUTO: 205 K/UL — SIGNIFICANT CHANGE UP (ref 150–400)
RBC # BLD: 4.25 M/UL — SIGNIFICANT CHANGE UP (ref 3.8–5.2)
RBC # FLD: 12.8 % — SIGNIFICANT CHANGE UP (ref 10.3–14.5)
SARS-COV-2 IGG+IGM SERPL QL IA: 0.4 U/ML — SIGNIFICANT CHANGE UP
SARS-COV-2 IGG+IGM SERPL QL IA: NEGATIVE — SIGNIFICANT CHANGE UP
SARS-COV-2 RNA SPEC QL NAA+PROBE: SIGNIFICANT CHANGE UP
T PALLIDUM AB TITR SER: NEGATIVE — SIGNIFICANT CHANGE UP
WBC # BLD: 8.05 K/UL — SIGNIFICANT CHANGE UP (ref 3.8–10.5)
WBC # FLD AUTO: 8.05 K/UL — SIGNIFICANT CHANGE UP (ref 3.8–10.5)

## 2021-10-08 PROCEDURE — 59400 OBSTETRICAL CARE: CPT | Mod: U9

## 2021-10-08 RX ORDER — LANOLIN
1 OINTMENT (GRAM) TOPICAL EVERY 6 HOURS
Refills: 0 | Status: DISCONTINUED | OUTPATIENT
Start: 2021-10-08 | End: 2021-10-09

## 2021-10-08 RX ORDER — AER TRAVELER 0.5 G/1
1 SOLUTION RECTAL; TOPICAL EVERY 4 HOURS
Refills: 0 | Status: DISCONTINUED | OUTPATIENT
Start: 2021-10-08 | End: 2021-10-09

## 2021-10-08 RX ORDER — IBUPROFEN 200 MG
600 TABLET ORAL EVERY 6 HOURS
Refills: 0 | Status: DISCONTINUED | OUTPATIENT
Start: 2021-10-08 | End: 2021-10-09

## 2021-10-08 RX ORDER — TETANUS TOXOID, REDUCED DIPHTHERIA TOXOID AND ACELLULAR PERTUSSIS VACCINE, ADSORBED 5; 2.5; 8; 8; 2.5 [IU]/.5ML; [IU]/.5ML; UG/.5ML; UG/.5ML; UG/.5ML
0.5 SUSPENSION INTRAMUSCULAR ONCE
Refills: 0 | Status: DISCONTINUED | OUTPATIENT
Start: 2021-10-08 | End: 2021-10-09

## 2021-10-08 RX ORDER — OXYTOCIN 10 UNIT/ML
333.33 VIAL (ML) INJECTION
Qty: 20 | Refills: 0 | Status: DISCONTINUED | OUTPATIENT
Start: 2021-10-08 | End: 2021-10-09

## 2021-10-08 RX ORDER — DIPHENHYDRAMINE HCL 50 MG
25 CAPSULE ORAL EVERY 6 HOURS
Refills: 0 | Status: DISCONTINUED | OUTPATIENT
Start: 2021-10-08 | End: 2021-10-09

## 2021-10-08 RX ORDER — MAGNESIUM HYDROXIDE 400 MG/1
30 TABLET, CHEWABLE ORAL
Refills: 0 | Status: DISCONTINUED | OUTPATIENT
Start: 2021-10-08 | End: 2021-10-09

## 2021-10-08 RX ORDER — CITRIC ACID/SODIUM CITRATE 300-500 MG
30 SOLUTION, ORAL ORAL ONCE
Refills: 0 | Status: COMPLETED | OUTPATIENT
Start: 2021-10-08 | End: 2021-10-08

## 2021-10-08 RX ORDER — HYDROCORTISONE 1 %
1 OINTMENT (GRAM) TOPICAL EVERY 6 HOURS
Refills: 0 | Status: DISCONTINUED | OUTPATIENT
Start: 2021-10-08 | End: 2021-10-09

## 2021-10-08 RX ORDER — OXYCODONE HYDROCHLORIDE 5 MG/1
5 TABLET ORAL
Refills: 0 | Status: DISCONTINUED | OUTPATIENT
Start: 2021-10-08 | End: 2021-10-09

## 2021-10-08 RX ORDER — SODIUM CHLORIDE 9 MG/ML
3 INJECTION INTRAMUSCULAR; INTRAVENOUS; SUBCUTANEOUS EVERY 8 HOURS
Refills: 0 | Status: DISCONTINUED | OUTPATIENT
Start: 2021-10-08 | End: 2021-10-09

## 2021-10-08 RX ORDER — SODIUM CHLORIDE 9 MG/ML
1000 INJECTION, SOLUTION INTRAVENOUS
Refills: 0 | Status: DISCONTINUED | OUTPATIENT
Start: 2021-10-08 | End: 2021-10-08

## 2021-10-08 RX ORDER — BENZOCAINE 10 %
1 GEL (GRAM) MUCOUS MEMBRANE EVERY 6 HOURS
Refills: 0 | Status: DISCONTINUED | OUTPATIENT
Start: 2021-10-08 | End: 2021-10-09

## 2021-10-08 RX ORDER — ACETAMINOPHEN 500 MG
975 TABLET ORAL
Refills: 0 | Status: DISCONTINUED | OUTPATIENT
Start: 2021-10-08 | End: 2021-10-09

## 2021-10-08 RX ORDER — KETOROLAC TROMETHAMINE 30 MG/ML
30 SYRINGE (ML) INJECTION ONCE
Refills: 0 | Status: DISCONTINUED | OUTPATIENT
Start: 2021-10-08 | End: 2021-10-08

## 2021-10-08 RX ORDER — DIBUCAINE 1 %
1 OINTMENT (GRAM) RECTAL EVERY 6 HOURS
Refills: 0 | Status: DISCONTINUED | OUTPATIENT
Start: 2021-10-08 | End: 2021-10-09

## 2021-10-08 RX ORDER — OXYCODONE HYDROCHLORIDE 5 MG/1
5 TABLET ORAL ONCE
Refills: 0 | Status: DISCONTINUED | OUTPATIENT
Start: 2021-10-08 | End: 2021-10-09

## 2021-10-08 RX ORDER — SIMETHICONE 80 MG/1
80 TABLET, CHEWABLE ORAL EVERY 4 HOURS
Refills: 0 | Status: DISCONTINUED | OUTPATIENT
Start: 2021-10-08 | End: 2021-10-09

## 2021-10-08 RX ORDER — IBUPROFEN 200 MG
600 TABLET ORAL EVERY 6 HOURS
Refills: 0 | Status: COMPLETED | OUTPATIENT
Start: 2021-10-08 | End: 2022-09-06

## 2021-10-08 RX ORDER — OXYTOCIN 10 UNIT/ML
VIAL (ML) INJECTION
Qty: 30 | Refills: 0 | Status: DISCONTINUED | OUTPATIENT
Start: 2021-10-08 | End: 2021-10-08

## 2021-10-08 RX ORDER — PRAMOXINE HYDROCHLORIDE 150 MG/15G
1 AEROSOL, FOAM RECTAL EVERY 4 HOURS
Refills: 0 | Status: DISCONTINUED | OUTPATIENT
Start: 2021-10-08 | End: 2021-10-09

## 2021-10-08 RX ADMIN — Medication 30 MILLILITER(S): at 11:26

## 2021-10-08 RX ADMIN — Medication 600 MILLIGRAM(S): at 23:47

## 2021-10-08 RX ADMIN — Medication 1000 MILLIUNIT(S)/MIN: at 15:25

## 2021-10-08 RX ADMIN — Medication 975 MILLIGRAM(S): at 21:29

## 2021-10-08 RX ADMIN — SODIUM CHLORIDE 125 MILLILITER(S): 9 INJECTION, SOLUTION INTRAVENOUS at 05:55

## 2021-10-08 RX ADMIN — Medication 30 MILLIGRAM(S): at 17:10

## 2021-10-08 RX ADMIN — Medication 2 MILLIUNIT(S)/MIN: at 06:45

## 2021-10-08 RX ADMIN — Medication 30 MILLIGRAM(S): at 17:30

## 2021-10-08 RX ADMIN — Medication 975 MILLIGRAM(S): at 20:37

## 2021-10-08 NOTE — OB RN DELIVERY SUMMARY - NSSELHIDDEN_OBGYN_ALL_OB_FT
[NS_DeliveryAttending1_OBGYN_ALL_OB_FT:[id number]=],[NS_DeliveryRN_OBGYN_ALL_OB_FT:[id number]=]

## 2021-10-08 NOTE — DISCHARGE NOTE OB - CARE PROVIDER_API CALL
Jann Holland (MD)  Obstetrics and Gynecology  [address] [place] [address]  Phone: [phone]  Fax: [phone]  Follow Up Time:

## 2021-10-08 NOTE — OB RN DELIVERY SUMMARY - NS_SEPSISRSKCALC_OBGYN_ALL_OB_FT
EOS calculated successfully. EOS Risk Factor: 0.5/1000 live births ([facility] national incidence); GA=40w4d; Temp=98.42; ROM=2.65; GBS='Negative'; Antibiotics='No antibiotics or any antibiotics < 2 hrs prior to birth'

## 2021-10-08 NOTE — OB RN PATIENT PROFILE - PRO PRENATAL LABS ORI SOURCE HIV
COVID-19 PCR test completed. Patient handout For Patients Who Have Been Tested for Covid-19 (Coronavirus) was given to the patient, which includes test result notification process.   hard copy, drawn during this pregnancy

## 2021-10-08 NOTE — OB PROVIDER DELIVERY SUMMARY - NSSELHIDDEN_OBGYN_ALL_OB_FT
[NS_DeliveryAttending1_OBGYN_ALL_OB_FT:[id number]=],[NS_DeliveryRN_OBGYN_ALL_OB_FT:[id number]=]

## 2021-10-08 NOTE — DISCHARGE NOTE OB - CARE PLAN
1 Principal Discharge DX:	Normal delivery  Assessment and plan of treatment:	1) Please take ibuprofen and/or Tylenol as needed for pain as prescribed.  2) Nothing in the vagina for 6 weeks (including no sex, no tampons, and no douching).  3) Please call your doctor for a follow up your postpartum appointment in 4 weeks.  4) Please continue taking vitamins postpartum. Take iron and colace for acute blood loss anemia.  5) Please call the office sooner if you have heavy vaginal bleeding, severe abdominal pain, or fever > 100.4F.  6) You may resume regular daily activity as tolerated

## 2021-10-08 NOTE — DISCHARGE NOTE OB - VISION (WITH CORRECTIVE LENSES IF THE PATIENT USUALLY WEARS THEM):
Normal vision: sees adequately in most situations; can see medication labels, newsprint EOAE (evoked otoacoustic emission)

## 2021-10-08 NOTE — DISCHARGE NOTE OB - MEDICATION SUMMARY - MEDICATIONS TO TAKE
I will START or STAY ON the medications listed below when I get home from the hospital:    acetaminophen 325 mg oral capsule  -- 3 cap(s) by mouth every 6 hours   -- Indication: For pain    ibuprofen 600 mg oral tablet  -- 1 tab(s) by mouth every 6 hours   -- Do not take this drug if you are pregnant.  It is very important that you take or use this exactly as directed.  Do not skip doses or discontinue unless directed by your doctor.  May cause drowsiness or dizziness.  Obtain medical advice before taking any non-prescription drugs as some may affect the action of this medication.  Take with food or milk.    -- Indication: For pain

## 2021-10-08 NOTE — DISCHARGE NOTE OB - PLAN OF CARE
1) Please take ibuprofen and/or Tylenol as needed for pain as prescribed.  2) Nothing in the vagina for 6 weeks (including no sex, no tampons, and no douching).  3) Please call your doctor for a follow up your postpartum appointment in 4 weeks.  4) Please continue taking vitamins postpartum. Take iron and colace for acute blood loss anemia.  5) Please call the office sooner if you have heavy vaginal bleeding, severe abdominal pain, or fever > 100.4F.  6) You may resume regular daily activity as tolerated

## 2021-10-08 NOTE — DISCHARGE NOTE OB - PATIENT PORTAL LINK FT
You can access the FollowMyHealth Patient Portal offered by [facility] by registering at the following website: [email or web address]. By joining [employer]’s FollowMyHealth portal, you will also be able to view your health information using other applications (apps) compatible with our system.

## 2021-10-09 VITALS
HEART RATE: 68 BPM | DIASTOLIC BLOOD PRESSURE: 72 MMHG | SYSTOLIC BLOOD PRESSURE: 112 MMHG | TEMPERATURE: 98 F | OXYGEN SATURATION: 100 % | RESPIRATION RATE: 18 BRPM

## 2021-10-09 LAB
HCT VFR BLD CALC: 37.6 % — SIGNIFICANT CHANGE UP (ref 34.5–45)
HGB BLD-MCNC: 12.4 G/DL — SIGNIFICANT CHANGE UP (ref 11.5–15.5)

## 2021-10-09 PROCEDURE — U0003: CPT

## 2021-10-09 PROCEDURE — 86850 RBC ANTIBODY SCREEN: CPT

## 2021-10-09 PROCEDURE — U0005: CPT

## 2021-10-09 PROCEDURE — G0463: CPT

## 2021-10-09 PROCEDURE — 86900 BLOOD TYPING SEROLOGIC ABO: CPT

## 2021-10-09 PROCEDURE — 85025 COMPLETE CBC W/AUTO DIFF WBC: CPT

## 2021-10-09 PROCEDURE — 86901 BLOOD TYPING SEROLOGIC RH(D): CPT

## 2021-10-09 PROCEDURE — 59025 FETAL NON-STRESS TEST: CPT

## 2021-10-09 PROCEDURE — 59050 FETAL MONITOR W/REPORT: CPT

## 2021-10-09 PROCEDURE — 36415 COLL VENOUS BLD VENIPUNCTURE: CPT

## 2021-10-09 PROCEDURE — 85014 HEMATOCRIT: CPT

## 2021-10-09 PROCEDURE — 86769 SARS-COV-2 COVID-19 ANTIBODY: CPT

## 2021-10-09 PROCEDURE — 86780 TREPONEMA PALLIDUM: CPT

## 2021-10-09 PROCEDURE — 85018 HEMOGLOBIN: CPT

## 2021-10-09 RX ORDER — ACETAMINOPHEN 500 MG
3 TABLET ORAL
Qty: 36 | Refills: 0
Start: 2021-10-09 | End: 2021-10-11

## 2021-10-09 RX ORDER — IBUPROFEN 200 MG
1 TABLET ORAL
Qty: 28 | Refills: 0
Start: 2021-10-09 | End: 2021-10-15

## 2021-10-09 RX ADMIN — Medication 600 MILLIGRAM(S): at 13:00

## 2021-10-09 RX ADMIN — SODIUM CHLORIDE 3 MILLILITER(S): 9 INJECTION INTRAMUSCULAR; INTRAVENOUS; SUBCUTANEOUS at 05:05

## 2021-10-09 RX ADMIN — Medication 975 MILLIGRAM(S): at 15:56

## 2021-10-09 RX ADMIN — Medication 975 MILLIGRAM(S): at 04:18

## 2021-10-09 RX ADMIN — Medication 975 MILLIGRAM(S): at 10:30

## 2021-10-09 RX ADMIN — Medication 975 MILLIGRAM(S): at 16:55

## 2021-10-09 RX ADMIN — Medication 600 MILLIGRAM(S): at 00:30

## 2021-10-09 RX ADMIN — Medication 975 MILLIGRAM(S): at 05:05

## 2021-10-09 RX ADMIN — Medication 600 MILLIGRAM(S): at 05:50

## 2021-10-09 RX ADMIN — Medication 975 MILLIGRAM(S): at 09:30

## 2021-10-09 RX ADMIN — Medication 600 MILLIGRAM(S): at 06:32

## 2021-10-09 RX ADMIN — Medication 600 MILLIGRAM(S): at 12:00

## 2021-10-09 RX ADMIN — Medication 1 TABLET(S): at 12:00

## 2021-10-09 NOTE — PROGRESS NOTE ADULT - ASSESSMENT
A/P:  MADELIN KHALIL is a 26y  now PPD#1 s/p spontaneous vaginal delivery at [id number], uncomplicated.  -Vital signs stable  -Hgb: 12.4 -> AM labs pending   -Voiding, tolerating PO  -Advance care as tolerated   -Continue routine postpartum care and education  -Healthy female infant  -Dispo: Anticipate discharge to home pending attending approval.

## 2021-10-09 NOTE — PROGRESS NOTE ADULT - SUBJECTIVE AND OBJECTIVE BOX
MADELIN KHALIL is a 26y  now PPD#1 s/p spontaneous vaginal delivery at [id number], uncomplicated.    S:    No acute events overnight.   The patient has no complaints.  Pain controlled with current treatment regimen.   She is ambulating without difficulty and tolerating PO.   + flatus/-BM/+ voiding   She endorses appropriate lochia, which is decreasing.   She is breastfeeding without difficulty.   She denies fevers, chills, nausea and vomiting.   She denies lightheadedness, dizziness, palpitations, chest pain and SOB.     O:    T(C): 36.8 (10-09-21 @ 04:14), Max: 37 (10-08-21 @ 17:37)  HR: 86 (10-09-21 @ 04:14) (76 - 122)  BP: 122/86 (10-09-21 @ 04:14) (91/53 - 181/81)  RR: 16 (10-09-21 @ 04:14) (16 - 18)  SpO2: 98% (10-09-21 @ 04:14) (97% - 98%)    Gen: NAD, AOx3  CV: RRR, S1/S2 present  Pulm: CTAB  Abdomen:  Soft, non-tender, non-distended, +bowel sounds  Uterus:  Fundus firm below umbilicus  VE:  Expected lochia  Ext:  b/l LE non-tender                           12.4   x     )-----------( x        ( 09 Oct 2021 06:45 )             37.6

## 2021-10-19 ENCOUNTER — NON-APPOINTMENT (OUTPATIENT)
Age: 26
End: 2021-10-19

## 2021-10-19 ENCOUNTER — APPOINTMENT (OUTPATIENT)
Dept: OBGYN | Facility: CLINIC | Age: 26
End: 2021-10-19
Payer: COMMERCIAL

## 2021-10-19 VITALS
DIASTOLIC BLOOD PRESSURE: 62 MMHG | HEIGHT: 68 IN | TEMPERATURE: 97.6 F | SYSTOLIC BLOOD PRESSURE: 108 MMHG | WEIGHT: 191.38 LBS | BODY MASS INDEX: 29.01 KG/M2

## 2021-10-19 LAB
BILIRUB UR QL STRIP: NORMAL
COLLECTION METHOD: NORMAL
GLUCOSE UR-MCNC: NORMAL
HCG UR QL: 0.2 EU/DL
HGB UR QL STRIP.AUTO: NORMAL
KETONES UR-MCNC: NORMAL
LEUKOCYTE ESTERASE UR QL STRIP: NORMAL
NITRITE UR QL STRIP: NORMAL
PH UR STRIP: 7
PROT UR STRIP-MCNC: NORMAL
SP GR UR STRIP: 1.01

## 2021-10-19 PROCEDURE — 99214 OFFICE O/P EST MOD 30 MIN: CPT | Mod: 24

## 2021-10-19 NOTE — PHYSICAL EXAM
[Appropriately responsive] : appropriately responsive [Alert] : alert [No Acute Distress] : no acute distress [Oriented x3] : oriented x3 [Examination Of The Breasts] : a normal appearance [Normal] : normal [Breast Abnormal Secretion Opalescent Fluid] : a milky discharge [___] : a [unfilled] ~Ucm area of erythema [No Masses] : no breast masses were palpable

## 2021-10-19 NOTE — COUNSELING
[Lab Results] : lab results [Contraception/ Emergency Contraception/ Safe Sexual Practices] : contraception, emergency contraception, safe sexual practices [HPV Vaccine] : HPV Vaccine

## 2021-11-02 NOTE — END OF VISIT
[FreeTextEntry3] : I, Yun Mendez solely acted as a scribe for Dr. Jann Holland on 10/19/2021 . All medical entries made by the scribe were at my, Dr. Holland's, direction and personally dictated by me on 10/19/2021 . I have reviewed the chart and agree that the record accurately reflects my personal performance of the history, physical exam, assessment and plan. I have also personally directed, reviewed, and agreed with the chart.

## 2021-11-02 NOTE — HISTORY OF PRESENT ILLNESS
[Gonorrhea test offered] : Gonorrhea test offered [Chlamydia test offered] : Chlamydia test offered [N] : Patient is not sexually active [Y] : Positive pregnancy history [Menarche Age: ____] : age at menarche was [unfilled] [Previously active] : previously active [Men] : men [Vaginal] : vaginal [No] : No [Patient refuses STI testing] : Patient refuses STI testing [TextBox_4] : Pt presents for possible mastitis. [Papeardate] : 02/16/2021 [TextBox_31] : Negative [GonorrheaDate] : 02/16/2021 [TextBox_63] : Negative [ChlamydiaDate] : 02/16/2021 [TextBox_68] : Negative [LMPDate] : 12/28/2020 [PGHxTotal] : 2 [[facility]Full[facility]] : 2 [[facility]Living] : 2 [FreeTextEntry1] : 12/28/2020

## 2021-11-02 NOTE — DISCUSSION/SUMMARY
[FreeTextEntry1] : -Breast exam significant for: Bilateral breast erythema. Milk cyst palpated on exam. Likely mastitis. \par \par -Recommended Ibuprofen 600 MG as needed, warm compresses, and radial massages. Prescription for Keflex ordered. I informed patient she may continue to breast feed while taking Keflex. \par \par -Prescription for Florastor ordered.\par \par -Recommended avoid nipple stimulation if she desires to discontinue breast feeding. She will continue breast feeding until follow up in 2 weeks.\par \par -Last pap smear collected on 3/15/21 was significant for: LSIL. Results were discussed. \par \par -Contraceptive options were discussed including OCPs, Depo- Provera, Nexplanon, and IUD. Risks, benefits, and alternatives were discussed. Educational pamphlet for Mirena IUD was given. She declines contraception today.\par \par We discussed and reviewed the risks and benefits of the Gardasil vaccine. Reassurances and support given.\par \par -She will follow up in 2 weeks for breast check or as needed.\par \par All questions and concerns were discussed.\par \par During this visit 20 minutes were spent face- to- face with greater than 50% of the time dedicated to counseling.

## 2021-11-03 ENCOUNTER — APPOINTMENT (OUTPATIENT)
Dept: OBGYN | Facility: CLINIC | Age: 26
End: 2021-11-03
Payer: COMMERCIAL

## 2021-11-03 VITALS
BODY MASS INDEX: 28.64 KG/M2 | DIASTOLIC BLOOD PRESSURE: 72 MMHG | SYSTOLIC BLOOD PRESSURE: 118 MMHG | HEIGHT: 68 IN | WEIGHT: 189 LBS

## 2021-11-03 PROCEDURE — 99213 OFFICE O/P EST LOW 20 MIN: CPT | Mod: 24

## 2021-11-05 NOTE — HISTORY OF PRESENT ILLNESS
[Patient reported PAP Smear was normal] : Patient reported PAP Smear was normal [HPV test offered] : HPV test offered [N] : Patient is not sexually active [Y] : Positive pregnancy history [Menarche Age: ____] : age at menarche was [unfilled] [Previously active] : previously active [Men] : men [Vaginal] : vaginal [No] : No [Patient refuses STI testing] : Patient refuses STI testing [PapSmeardate] : 02.16.21 [HPVDate] : 02.16.21 [TextBox_78] : NEG [LMPDate] : 12.28.20 [PGHxTotal] : 2 [[facility]xFullTerm] : 2 [[facility]xLiving] : 2 [FreeTextEntry1] : 12.28.20

## 2021-11-05 NOTE — PHYSICAL EXAM
[Chaperone Present] : A chaperone was present in the examining room during all aspects of the physical examination [Appropriately responsive] : appropriately responsive [Alert] : alert [No Acute Distress] : no acute distress [No Lymphadenopathy] : no lymphadenopathy [Examination Of The Breasts] : a normal appearance [Normal] : normal [No Masses] : no breast masses were palpable

## 2021-11-05 NOTE — DISCUSSION/SUMMARY
[FreeTextEntry1] : We discussed the need for her to allow a small amount of breast milk to dry on the nipple as a natural lubricant. All questions were answered and she will RTO on her regular schedule.

## 2021-11-22 ENCOUNTER — APPOINTMENT (OUTPATIENT)
Dept: OBGYN | Facility: CLINIC | Age: 26
End: 2021-11-22
Payer: COMMERCIAL

## 2021-11-22 VITALS
SYSTOLIC BLOOD PRESSURE: 122 MMHG | BODY MASS INDEX: 28.49 KG/M2 | DIASTOLIC BLOOD PRESSURE: 84 MMHG | WEIGHT: 188 LBS | HEIGHT: 68 IN

## 2021-11-22 DIAGNOSIS — Z34.93 ENCOUNTER FOR SUPERVISION OF NORMAL PREGNANCY, UNSPECIFIED, THIRD TRIMESTER: ICD-10-CM

## 2021-11-22 DIAGNOSIS — O48.0 POST-TERM PREGNANCY: ICD-10-CM

## 2021-11-22 DIAGNOSIS — R73.09 OTHER ABNORMAL GLUCOSE: ICD-10-CM

## 2021-11-22 DIAGNOSIS — O26.843 UTERINE SIZE-DATE DISCREPANCY, THIRD TRIMESTER: ICD-10-CM

## 2021-11-22 PROCEDURE — 0503F POSTPARTUM CARE VISIT: CPT

## 2021-11-22 NOTE — HISTORY OF PRESENT ILLNESS
[Postpartum Follow Up] : postpartum follow up [Last Pap Date: ___] : Last Pap Date: [unfilled] [Delivery Date: ___] : on [unfilled] [] : delivered by vaginal delivery [Female] : Delivery History: baby girl [Breastfeeding] : currently nursing [BF with Difficulty] : nursing with difficulty [Fatigue] : fatigue [Intended Contraception] : Intended Contraception: [Consistent Condom Use] : consistent condom use [Back to Normal] : is back to normal in size [Doing Well] : is doing well [No Sign of Infection] : is showing no signs of infection [Excellent Pain Control] : has excellent pain control [Complications:___] : no complications [BTL] : no tubal ligation [Resumed Menses] : has not resumed her menses [Resumed Fannett] : has not resumed intercourse [Abdominal Pain] : no abdominal pain [Back Pain] : no back pain [Breast Pain] : no breast pain [BreastFeeding Problems] : no breastfeeding problems [Chest Pain] : no chest pain [Cracked Nipples] : no cracked nipples [S/Sx PP Depression] : no signs/symptoms of postpartum depression [Episiotomy Site Pain] : no episiotomy site pain [Heavy Bleeding] : no heavy bleeding [Irregular Bleeding] : no irregular bleeding [Leg Pain] : no leg pain [Shortness of Breath] : no shortness of breath [Suicidal Ideation] : no suicidal ideation [Vaginal Discharge] : no vaginal discharge [Chills] : no chills [Dysuria] : no dysuria [Fever] : no fever [Headache] : no headache [Nausea] : no nausea [Vomiting] : no vomiting [Normal] : the vagina was normal [Hematoma] : no vaginal hematoma [Abscess Formation] : no vaginal abscess [Healing Well] : is not healing well [Infected] : did not appear infected [Dehiscence] : was not dehisced [Examination Of The Breasts] : breasts are normal [None] : None [de-identified] : clogged duct intermittent [de-identified] : Breastfeeding discussed.  [de-identified] : RTO for annual she has LGSIL pap

## 2021-12-30 ENCOUNTER — NON-APPOINTMENT (OUTPATIENT)
Age: 26
End: 2021-12-30

## 2022-01-31 NOTE — OB RN PATIENT PROFILE - NSTOBACCONEVERSMOKERY/N_GEN_A
Detail Level: Zone Initiate Treatment: fluorouracil 5 % topical cream daily\\nQuantity: 40.0 g  Days Supply: 30\\nSig: Apply cream to affected area on face once daily at night x 2 weeks, then discontinue. Initiate Treatment: triamcinolone acetonide 0.1 % topical cream BID\\nQuantity: 454.0 g  Days Supply: 30\\nSig: Apply twice daily to eczema on legs and arms up to 2 weeks then PRN No

## 2022-02-24 ENCOUNTER — APPOINTMENT (OUTPATIENT)
Dept: OBGYN | Facility: CLINIC | Age: 27
End: 2022-02-24

## 2022-06-28 NOTE — OB RN PATIENT PROFILE - NUTRITION PROFILE
Dr. Mejias  Neuro Interventional    Chart Prep    Name: Maurisio Sifuentes   MRN: [id number]   : 1961 (61 year old)  Sex: male   Appointment Date: 22    Previous Assessment/Plan (Dated 2022):   · Chronic L cervical ICA occlusion with small spontaneous recanalization in cervical-petrous segment since 2021 but cavernous/petrous remains occluded)    · Hx Left thalamocapsular hemorrhage (2022)  · Atrial fibrillation (Previous Eliquis held since [facility] in 2022)  · L MCA saccular aneurysm ~ 2.5 mm  · HLD  · HTN  · Tobacco use  · Alcohol abuse  Discharge Instructions & Follow-up:  · Continue Aspirin 325mg daily, Plavix 75mg daily. Current plan will to be to continue DAPT x3 months, then consider stopping Plavix and starting low dose AC while continuing ASA. New scripts for ASA & Plavix sent.   · Will review pt's case at next week's [facility] morning meeting.   · Follow-up with Dr. Mejias on 22. Pt to obtain response labs prior. Staff message sent to RN regarding this plan.  · Dr. Hanson planning to discuss future AC regimen with Dr. Cadena (pt's cardiologist).     Relevant Symptoms:   Chronic right side numbness    Headache Description:  (+)    History of Present Illness:   Maurisio Sifuentes is a 61 year old male with a PMH (past medical history) significant for but not limited to HTN, depression, back pain, tremor, HLD, seizure, atrial fibrillation on Eliquis, ETOH abuse, and tobacco use (40 pack years). Maurisio presented 21 @ 2030 to [facility] with complaints of right sided weakness. LKWT 1845. CT head was without acute findings. CTA head/neck demonstrated occluded L ICA from cervical to cavernous segment with reconstitution and 50% stenosis at the siphon and incidentally noted L MCA 2.5 mm saccular aneurysm at the bifurcation. Presenting symptoms resolved. Dr. Mejias was contacted and due to risk for recurrence and possible need for acute intervention advised transferred to  [facility] for further care. MRI brain without acute ischemia, evidence of old lacunar stroke of left basal ganglia.  He was able to be discharged 7/6/21 with plan to continue Aspirin and Eliquis and repeat CTA Head Neck at 3 month interval.  Patient was last seen in clinic on 10/11/21 where plan was for 1 year follow up with CTA h/n prior. Since his visit, he presented to the ED on 10/27/21 with complaints of becoming lightheaded and falling onto his knees without striking his head. Hypotensive and improved with fluids and he was discharged home. Patient again presented to the ED on 12/10/21 with transient episodes of unresponsiveness and right sided weakness. Pt was found to be hypotensive. CT/CTA without new acute findings. Fluids given and pt discharged home. He was seen in the [facility] on 4/25/22 with complaints of AMS. Pt states he was sitting at his desk and felt like he could not concentrate or process what he was supposed to be doing and endorsed right hand tingling. Hypertensive on arrival with /105. CT head with new left parenchymal hemorrhage. He was admitted to the ICU and neurosurgery was consulted who recommended stopping ASA and Eliquis and repeating CTOH. Follow up CT was stable and patient was re-started on ASA due to his significant risk of stroke but continue to hold Eliquis until BP controlled. He was discharged home on 4/28/22. Maurisio was seen in clinic on 5/9/2022 endorsing right sided numbness and it was decided that his case would be discussed in [facility]. At [facility] decision made for Dr. Hanson to reach out to vascular surgery to see if patient was CEA candidate, in which patient was too high risk and therefore angioplasty and stenting was recommended. Maurisio presented on 6/24/22 for elective treatment of occlusion & near occlusion of intra/extra cranial R ICA by Dr. Mejias. Angioplasty/stenting was unable to be performed. Intraop, Integrilin bolus was accidentally given. Pt was extubated post  procedure in IR, and transported to neuro ICU for close monitoring. Prior to transfer to neuro ICU, pt developed R inguinal hematoma, additional manual pressure applied. Prior to leaving IR, pt's groin site remained stable. Upon admission to neuro ICU, pt's vital signs, neuro exam and procedure site remained stable ad he was able to discharge home on DAPT. He arrives to clinic today to discuss follow up plan.       Notes:      History:   Allergies   Allergen Reactions   • Bee Venom HIVES and SHORTNESS OF BREATH   • Ibuprofen Other (See Comments)     kidney failure  Acute interstitial nephritis   • Hydrochlorothiazide Other (See Comments)     Triggered hyponatremia and gout attack      Past Medical History:   Diagnosis Date   • Alcohol abuse    • Alcoholic hepatitis    • Chronic back pain    • Depression    • Essential tremor    • Hyperlipidemia    • Hypertension    • Neuropathy of foot    • Seizure (CMS/HCC)    • Smoking       Past Surgical History:   Procedure Laterality Date   • COLONOSCOPY  2013    DR.STAN MORALES; TUBULAR ADENOMA   • ELBOW FRACTURE SURGERY Right 2017   • IR CAROTID STENT  06/24/2022        Medications:  Current Outpatient Medications   Medication Sig   • olmesartan (BENICAR) 5 MG tablet Take 1 tablet by mouth 2 times daily.   • aspirin 325 MG tablet Take 1 tablet by mouth daily.   • clopidogrel (Plavix) 75 MG tablet Take 1 tablet by mouth daily.   • amLODIPine (NORVASC) 10 MG tablet Take 1 tablet by mouth daily.   • pantoprazole (PROTONIX) 40 MG tablet Take 1 tablet by mouth daily.   • topiramate (TOPAMAX) 25 MG tablet Take 1 tablet by mouth twice daily  for 2 weeks, if no improvement in tremor may increase to 2 tablets twice daily thereafter.   • primidone (MYSOLINE) 250 MG tablet Take 1 tablet by mouth 2 times daily.   • buPROPion XL (WELLBUTRIN XL) 150 MG 24 hr tablet Take 1 tablet by mouth daily.   • potassium CHLORIDE (KLOR-CON M) 20 MEQ thony ER No indicators present

## 2022-08-03 ENCOUNTER — NON-APPOINTMENT (OUTPATIENT)
Age: 27
End: 2022-08-03

## 2022-10-05 NOTE — OB RN DELIVERY SUMMARY - NS_LABORDURATION_OBGYN_ALL_OB_FT
How Severe Is Your Cyst?: mild
Is This A New Presentation, Or A Follow-Up?: Cyst
7 Hour(s) 22 Minute(s)

## 2022-11-19 NOTE — OB RN PATIENT PROFILE - NS_PRENATALCARE_OBGYN_ALL_OB
Annia Cabrera was seen and treated in our emergency department on 11/19/2022  Diagnosis:     Yoseph  may return to work on return date  She may return on this date: 11/21/2022         If you have any questions or concerns, please don't hesitate to call        Chuy Hair PA-C    ______________________________           _______________          _______________  Hospital Representative                              Date                                Time
Yes

## 2023-04-02 ENCOUNTER — NON-APPOINTMENT (OUTPATIENT)
Age: 28
End: 2023-04-02

## 2023-04-06 ENCOUNTER — NON-APPOINTMENT (OUTPATIENT)
Age: 28
End: 2023-04-06

## 2023-09-07 NOTE — OB RN PATIENT PROFILE - NS PRO PT RIGHT SUPPORT PERSON
Declines Initiate Treatment: Doxycycline- take one tablet po bid with a full meal\\nTretinoin- apply a pea sized amount to affected areas in the evening as tolerated\\nDapsone- apply to face bid Detail Level: Zone Plan: If no improvement consider Amzeeq

## 2023-11-27 NOTE — OB PROVIDER H&P - PRO HBSAG INFANT
Patient remains at baseline mental status. Appears to be resting comfortably in stretcher, breathing even and unlabored on RA. Vital signs as charted. NAD noted at this time. Medications administered as per eMAR. IV site clean dry and intact. Troponin resulted elevated 862. Repeat troponin drawn and sent. Heparin drip using ACS nomogram started as ordered. Safety maintained, stretcher locked in lowest position with siderails up x2, call bell and personal items within reach. negative

## 2023-12-27 ENCOUNTER — NON-APPOINTMENT (OUTPATIENT)
Age: 28
End: 2023-12-27

## 2024-01-11 ENCOUNTER — APPOINTMENT (OUTPATIENT)
Dept: OBGYN | Facility: CLINIC | Age: 29
End: 2024-01-11
Payer: COMMERCIAL

## 2024-01-11 VITALS
SYSTOLIC BLOOD PRESSURE: 112 MMHG | BODY MASS INDEX: 26.26 KG/M2 | DIASTOLIC BLOOD PRESSURE: 70 MMHG | WEIGHT: 173.25 LBS | HEIGHT: 68 IN

## 2024-01-11 DIAGNOSIS — N61.0 MASTITIS WITHOUT ABSCESS: ICD-10-CM

## 2024-01-11 DIAGNOSIS — Z87.42 PERSONAL HISTORY OF OTHER DISEASES OF THE FEMALE GENITAL TRACT: ICD-10-CM

## 2024-01-11 DIAGNOSIS — Z23 ENCOUNTER FOR IMMUNIZATION: ICD-10-CM

## 2024-01-11 LAB — HCG UR QL: NEGATIVE

## 2024-01-11 PROCEDURE — 36415 COLL VENOUS BLD VENIPUNCTURE: CPT

## 2024-01-11 PROCEDURE — 81025 URINE PREGNANCY TEST: CPT

## 2024-01-11 PROCEDURE — 99213 OFFICE O/P EST LOW 20 MIN: CPT

## 2024-01-11 NOTE — END OF VISIT
[FreeTextEntry3] : I, Alma Rosa Moore, solely acted as scribe for Dr. Porsha Abreu on 01/11/2024. All medical entries made by the Scribe were at my, Dr. Abreu's, direction and personally dictated by me on 01/11/2024. I have reviewed the chart and agree that the record accurately reflects my personal performance of the history, physical exam, assessment and plan. I have also personally directed, reviewed, and agreed with the chart.

## 2024-01-11 NOTE — HISTORY OF PRESENT ILLNESS
[N] : Patient does not use contraception [Y] : Positive pregnancy history [Menarche Age: ____] : age at menarche was [unfilled] [No] : Patient does not have concerns regarding sex [Papeardate] : 02/16/21 [TextBox_31] : lsil [LMPDate] :  12/05/23 [PGHxTotal] : 3 [[facility] 2 [[facility] : 2 [PGHxABSpont] : 1 [FreeTextEntry1] : 12/5/23

## 2024-01-11 NOTE — DISCUSSION/SUMMARY
[FreeTextEntry1] : I explained that her urine pregnancy test here is negative today. HCG bloodwork done today. If positive, will repeat in 48 hours. Likely was a chemical pregnancy.  Recommended colposcopy secondary to positive HPV and LSIL. Will schedule here. Will attempt to obtain pap results.  F/u in 2 days for repeat BW if positive HCG today and colposcopy.

## 2024-01-12 ENCOUNTER — NON-APPOINTMENT (OUTPATIENT)
Age: 29
End: 2024-01-12

## 2024-01-12 LAB — HCG-TM SERPL-MCNC: 5 MIU/ML

## 2024-01-13 LAB
ABO + RH PNL BLD: NORMAL
BLD GP AB SCN SERPL QL: NORMAL

## 2024-02-01 ENCOUNTER — APPOINTMENT (OUTPATIENT)
Dept: ANTEPARTUM | Facility: CLINIC | Age: 29
End: 2024-02-01

## 2024-02-01 ENCOUNTER — APPOINTMENT (OUTPATIENT)
Dept: OBGYN | Facility: CLINIC | Age: 29
End: 2024-02-01

## 2024-02-15 ENCOUNTER — APPOINTMENT (OUTPATIENT)
Dept: OBGYN | Facility: CLINIC | Age: 29
End: 2024-02-15
Payer: COMMERCIAL

## 2024-02-15 VITALS
DIASTOLIC BLOOD PRESSURE: 70 MMHG | BODY MASS INDEX: 26.22 KG/M2 | WEIGHT: 173 LBS | SYSTOLIC BLOOD PRESSURE: 112 MMHG | HEIGHT: 68 IN

## 2024-02-15 DIAGNOSIS — K64.9 UNSPECIFIED HEMORRHOIDS: ICD-10-CM

## 2024-02-15 DIAGNOSIS — R87.612 LOW GRADE SQUAMOUS INTRAEPITHELIAL LESION ON CYTOLOGIC SMEAR OF CERVIX (LGSIL): ICD-10-CM

## 2024-02-15 DIAGNOSIS — Z32.01 ENCOUNTER FOR PREGNANCY TEST, RESULT POSITIVE: ICD-10-CM

## 2024-02-15 DIAGNOSIS — Z87.42 PERSONAL HISTORY OF OTHER DISEASES OF THE FEMALE GENITAL TRACT: ICD-10-CM

## 2024-02-15 DIAGNOSIS — R87.810 CERVICAL HIGH RISK HUMAN PAPILLOMAVIRUS (HPV) DNA TEST POSITIVE: ICD-10-CM

## 2024-02-15 LAB
HCG UR QL: NEGATIVE
QUALITY CONTROL: YES

## 2024-02-15 PROCEDURE — 81025 URINE PREGNANCY TEST: CPT

## 2024-02-15 PROCEDURE — 57454 BX/CURETT OF CERVIX W/SCOPE: CPT

## 2024-02-15 PROCEDURE — 99213 OFFICE O/P EST LOW 20 MIN: CPT | Mod: 25

## 2024-02-15 RX ORDER — CEPHALEXIN 500 MG/1
500 CAPSULE ORAL 4 TIMES DAILY
Qty: 40 | Refills: 0 | Status: DISCONTINUED | COMMUNITY
Start: 2021-10-19 | End: 2024-02-15

## 2024-02-15 RX ORDER — SACCHAROMYCES BOULARDII 50 MG
250 CAPSULE ORAL TWICE DAILY
Qty: 120 | Refills: 3 | Status: DISCONTINUED | COMMUNITY
Start: 2021-10-19 | End: 2024-02-15

## 2024-02-15 RX ORDER — HYDROCORTISONE 25 MG/G
2.5 CREAM TOPICAL DAILY
Qty: 1 | Refills: 1 | Status: ACTIVE | COMMUNITY
Start: 2024-02-15 | End: 1900-01-01

## 2024-02-15 NOTE — HISTORY OF PRESENT ILLNESS
[N] : Patient does not use contraception [Y] : Positive pregnancy history [Menarche Age: ____] : age at menarche was [unfilled] [No] : Patient does not have concerns regarding sex [Currently Active] : currently active [Men] : men [FreeTextEntry1] : MADELIN 29 yo  LMP 24 is here today for a colposcopy secondary to LSIL and positive HPV.  LSIL and positive HPV pap was done at a different OB/GYN.  She c/o a very painful external hemorrhoid. [Papeardate] : 02/16/21 [TextBox_31] : LSIL [HPVDate] : 12/30/19 [TextBox_78] : NEG [PGHxTotal] : 3 [[facility]] : 2 [[facility]xLiving] : 2 [PGHxABSpont] : 1

## 2024-02-15 NOTE — DISCUSSION/SUMMARY
[FreeTextEntry1] : 1. Colposcopy was done today. Cervical biopsy at 3 o'clock. ECC performed. Patient tolerated the procedure well with no complications. Post-procedure instructions were given. Patient expressed understanding.  2. Prescription strength Prep-H given for external hemorrhoid.   All questions were answered.  Follow up annually and as needed.

## 2024-02-15 NOTE — END OF VISIT
[FreeTextEntry3] : I, Alma Rosa Moore, solely acted as scribe for Dr. Porsha Abreu on 02/15/2024. All medical entries made by the Scribe were at my, Dr. Abreu's, direction and personally dictated by me on 02/15/2024. I have reviewed the chart and agree that the record accurately reflects my personal performance of the history, physical exam, assessment and plan. I have also personally directed, reviewed, and agreed with the chart.

## 2024-02-15 NOTE — PROCEDURE
[Colposcopy] : Colposcopy  [Time out performed] : Pre-procedure time out performed.  Patient's name, date of birth and procedure confirmed. [Consent Obtained] : Consent obtained [Risks] : risks [Benefits] : benefits [Alternatives] : alternatives [Patient] : patient [Infection] : infection [Bleeding] : bleeding [Allergic Reaction] : allergic reaction [LGSIL] : LGSIL [Colposcopy Adequate] : colposcopy adequate [SCI Fully Visualized] : SCI fully visualized [ECC Performed] : ECC performed [Biopsy] : biopsy taken [Hemostasis Obtained] : Hemostasis obtained [Tolerated Well] : the patient tolerated the procedure well [Pap Performed] : pap not performed [de-identified] : HPV [de-identified] : 1 [de-identified] : 3 o'clock [de-identified] : Silver nitrate used.

## 2024-02-26 ENCOUNTER — NON-APPOINTMENT (OUTPATIENT)
Age: 29
End: 2024-02-26

## 2024-02-28 LAB — CORE LAB BIOPSY: NORMAL

## 2024-06-27 NOTE — DISCHARGE NOTE OB - FUNCTIONAL SCREEN CURRENT LEVEL: BATHING, MLM
Communicate fall risk and risk factors to all staff, patient, and family/Provide visual cue: yellow wristband, yellow gown, etc/Reinforce activity limits and safety measures with patient and family/Call bell, personal items and telephone in reach/Instruct patient to call for assistance before getting out of bed/chair/stretcher/Non-slip footwear applied when patient is off stretcher/[place] to call system/Physically safe environment - no spills, clutter or unnecessary equipment/Purposeful Proactive Rounding/Room/bathroom lighting operational, light cord in reach 0 = independent

## 2024-08-03 ENCOUNTER — NON-APPOINTMENT (OUTPATIENT)
Age: 29
End: 2024-08-03

## 2024-10-01 NOTE — OB RN PATIENT PROFILE - NSLDARRIVAL_OBGYN_ALL_OB_START_DATE
Subjective   Patient ID: Key Hair is a 8 y.o. female.    HPI  Informant: parent    Concerns:  hemoglobin A1C improving but still elevated.   Interval history: no significant illnesses, emergency department visits, surgeries, or changes to family history.     Diet History:  Appetite? excellent   Meats? many   Fruits? many   Vegetables? many   Junk Food?many   Intolerances? yes, gluten     Sleep History:  Sleep Pattern: no sleep issues     Problems? no    Educational History:  School: [employer]:rd rd3rd Type of Student: excellent  Extracurricular Activities: horse back riding    Behavioral Assessment:   Is your child restless or overactive?  Never   Excitable, impulsive? Never   Fails to finish things he/she starts?  Never   Inattentive, easily distracted?  Never   Temper outbursts? Never   Fidgeting? Never   Disturbs other children? Never   Demands must be met immediately-easily frustrated? Never   Cries often and easily? Never   Mood changes quickly and drastically?  Never    Medications:  All medications have been reviewed.  Currently is not taking over-the-counter medication(s).  Medication(s) currently being used have been reviewed and added to the medication list.    Review of Systems   All other systems reviewed and are negative.         Objective   Physical Exam  Vitals reviewed.   Constitutional:       General: She is not in acute distress.     Appearance: She is well-developed.   HENT:      Right Ear: Tympanic membrane and external ear normal.      Left Ear: Tympanic membrane and external ear normal.      Nose: Nose normal.      Mouth/Throat:      Mouth: Mucous membranes are moist.      Pharynx: Oropharynx is clear.      Tonsils: No tonsillar exudate.   Eyes:      Conjunctiva/sclera: Conjunctivae normal.      Pupils: Pupils are equal, round, and reactive to light.   Cardiovascular:      Rate and Rhythm: Normal rate and regular rhythm.      Heart sounds: S1 normal. No murmur heard.  Pulmonary:  08-Oct-2021 05:13

## 2024-12-31 ENCOUNTER — NON-APPOINTMENT (OUTPATIENT)
Age: 29
End: 2024-12-31